# Patient Record
Sex: MALE | Race: BLACK OR AFRICAN AMERICAN | Employment: UNEMPLOYED | URBAN - METROPOLITAN AREA
[De-identification: names, ages, dates, MRNs, and addresses within clinical notes are randomized per-mention and may not be internally consistent; named-entity substitution may affect disease eponyms.]

---

## 2018-07-12 ENCOUNTER — HOSPITAL ENCOUNTER (EMERGENCY)
Age: 33
Discharge: HOME | End: 2018-07-12
Payer: MEDICARE

## 2018-07-12 ENCOUNTER — APPOINTMENT (OUTPATIENT)
Dept: GENERAL RADIOLOGY | Age: 33
End: 2018-07-12
Payer: MEDICARE

## 2018-07-12 VITALS
TEMPERATURE: 98 F | RESPIRATION RATE: 18 BRPM | WEIGHT: 165 LBS | SYSTOLIC BLOOD PRESSURE: 153 MMHG | HEIGHT: 67 IN | HEART RATE: 102 BPM | OXYGEN SATURATION: 98 % | DIASTOLIC BLOOD PRESSURE: 93 MMHG | BODY MASS INDEX: 25.9 KG/M2

## 2018-07-12 DIAGNOSIS — S66.912A WRIST STRAIN, LEFT, INITIAL ENCOUNTER: ICD-10-CM
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
## 2018-07-12 PROCEDURE — 6370000000 HC RX 637 (ALT 250 FOR IP): Performed by: NURSE PRACTITIONER

## 2018-07-12 PROCEDURE — 99283 EMERGENCY DEPT VISIT LOW MDM: CPT

## 2018-07-12 PROCEDURE — 73110 X-RAY EXAM OF WRIST: CPT

## 2018-07-12 PROCEDURE — 73130 X-RAY EXAM OF HAND: CPT

## 2018-07-12 RX ORDER — IBUPROFEN 800 MG/1
800 TABLET ORAL ONCE
Status: COMPLETED | OUTPATIENT
Start: 2018-07-12 | End: 2018-07-12

## 2018-07-12 RX ORDER — IBUPROFEN 800 MG/1
800 TABLET ORAL EVERY 8 HOURS PRN
Qty: 21 TABLET | Refills: 0 | Status: SHIPPED | OUTPATIENT
Start: 2018-07-12 | End: 2018-11-10

## 2018-07-12 RX ADMIN — IBUPROFEN 800 MG: 800 TABLET ORAL at 17:13

## 2018-07-12 ASSESSMENT — PAIN DESCRIPTION - LOCATION: LOCATION: WRIST

## 2018-07-12 ASSESSMENT — PAIN DESCRIPTION - ORIENTATION: ORIENTATION: LEFT

## 2018-07-12 ASSESSMENT — PAIN SCALES - GENERAL
PAINLEVEL_OUTOF10: 8
PAINLEVEL_OUTOF10: 7

## 2018-07-12 ASSESSMENT — PAIN DESCRIPTION - PAIN TYPE: TYPE: ACUTE PAIN

## 2018-11-09 ENCOUNTER — APPOINTMENT (OUTPATIENT)
Dept: CT IMAGING | Age: 33
End: 2018-11-09
Payer: MEDICARE

## 2018-11-09 ENCOUNTER — HOSPITAL ENCOUNTER (EMERGENCY)
Age: 33
Discharge: HOME | End: 2018-11-10
Payer: MEDICARE

## 2018-11-09 DIAGNOSIS — F12.10 MARIJUANA ABUSE: ICD-10-CM

## 2018-11-09 DIAGNOSIS — R11.15 NON-INTRACTABLE CYCLICAL VOMITING WITHOUT NAUSEA: Primary | ICD-10-CM

## 2018-11-09 LAB
ALBUMIN SERPL-MCNC: 4 G/DL (ref 3.5–5.2)
ALP BLD-CCNC: 147 U/L (ref 40–129)
ALT SERPL-CCNC: 61 U/L (ref 0–40)
AMPHETAMINE SCREEN, URINE: NOT DETECTED
ANION GAP SERPL CALCULATED.3IONS-SCNC: 11 MMOL/L (ref 7–16)
AST SERPL-CCNC: 30 U/L (ref 0–39)
BARBITURATE SCREEN URINE: NOT DETECTED
BASOPHILS ABSOLUTE: 0 E9/L (ref 0–0.2)
BASOPHILS RELATIVE PERCENT: 0 % (ref 0–2)
BENZODIAZEPINE SCREEN, URINE: NOT DETECTED
BILIRUB SERPL-MCNC: 0.7 MG/DL (ref 0–1.2)
BILIRUBIN URINE: NEGATIVE
BLOOD, URINE: NEGATIVE
BUN BLDV-MCNC: 12 MG/DL (ref 6–20)
CALCIUM SERPL-MCNC: 10.5 MG/DL (ref 8.6–10.2)
CANNABINOID SCREEN URINE: POSITIVE
CHLORIDE BLD-SCNC: 104 MMOL/L (ref 98–107)
CLARITY: CLEAR
CO2: 26 MMOL/L (ref 22–29)
COCAINE METABOLITE SCREEN URINE: NOT DETECTED
COLOR: YELLOW
CREAT SERPL-MCNC: 0.6 MG/DL (ref 0.7–1.2)
EOSINOPHILS ABSOLUTE: 0.11 E9/L (ref 0.05–0.5)
EOSINOPHILS RELATIVE PERCENT: 2.8 % (ref 0–6)
GFR AFRICAN AMERICAN: >60
GFR NON-AFRICAN AMERICAN: >60 ML/MIN/1.73
GLUCOSE BLD-MCNC: 102 MG/DL (ref 74–99)
GLUCOSE URINE: NEGATIVE MG/DL
HCT VFR BLD CALC: 36.4 % (ref 37–54)
HEMOGLOBIN: 11.5 G/DL (ref 12.5–16.5)
IMMATURE GRANULOCYTES #: 0.01 E9/L
IMMATURE GRANULOCYTES %: 0.3 % (ref 0–5)
KETONES, URINE: NEGATIVE MG/DL
LACTIC ACID: 1.2 MMOL/L (ref 0.5–2.2)
LEUKOCYTE ESTERASE, URINE: ABNORMAL
LYMPHOCYTES ABSOLUTE: 1.65 E9/L (ref 1.5–4)
LYMPHOCYTES RELATIVE PERCENT: 41.6 % (ref 20–42)
MCH RBC QN AUTO: 27 PG (ref 26–35)
MCHC RBC AUTO-ENTMCNC: 31.6 % (ref 32–34.5)
MCV RBC AUTO: 85.4 FL (ref 80–99.9)
METHADONE SCREEN, URINE: NOT DETECTED
MONOCYTES ABSOLUTE: 0.65 E9/L (ref 0.1–0.95)
MONOCYTES RELATIVE PERCENT: 16.4 % (ref 2–12)
NEUTROPHILS ABSOLUTE: 1.55 E9/L (ref 1.8–7.3)
NEUTROPHILS RELATIVE PERCENT: 38.9 % (ref 43–80)
NITRITE, URINE: NEGATIVE
OPIATE SCREEN URINE: NOT DETECTED
PDW BLD-RTO: 14 FL (ref 11.5–15)
PH UA: 6.5 (ref 5–9)
PHENCYCLIDINE SCREEN URINE: NOT DETECTED
PLATELET # BLD: 274 E9/L (ref 130–450)
PMV BLD AUTO: 10.2 FL (ref 7–12)
POTASSIUM SERPL-SCNC: 4.3 MMOL/L (ref 3.5–5)
PROPOXYPHENE SCREEN: NOT DETECTED
PROTEIN UA: NEGATIVE MG/DL
RBC # BLD: 4.26 E12/L (ref 3.8–5.8)
SODIUM BLD-SCNC: 141 MMOL/L (ref 132–146)
SPECIFIC GRAVITY UA: 1.02 (ref 1–1.03)
TOTAL PROTEIN: 7.1 G/DL (ref 6.4–8.3)
UROBILINOGEN, URINE: 0.2 E.U./DL
WBC # BLD: 4 E9/L (ref 4.5–11.5)

## 2018-11-09 PROCEDURE — 70450 CT HEAD/BRAIN W/O DYE: CPT

## 2018-11-09 PROCEDURE — 36415 COLL VENOUS BLD VENIPUNCTURE: CPT

## 2018-11-09 PROCEDURE — 2580000003 HC RX 258: Performed by: NURSE PRACTITIONER

## 2018-11-09 PROCEDURE — 99284 EMERGENCY DEPT VISIT MOD MDM: CPT

## 2018-11-09 PROCEDURE — 96374 THER/PROPH/DIAG INJ IV PUSH: CPT

## 2018-11-09 PROCEDURE — G0480 DRUG TEST DEF 1-7 CLASSES: HCPCS

## 2018-11-09 PROCEDURE — 85025 COMPLETE CBC W/AUTO DIFF WBC: CPT

## 2018-11-09 PROCEDURE — 80307 DRUG TEST PRSMV CHEM ANLYZR: CPT

## 2018-11-09 PROCEDURE — 83605 ASSAY OF LACTIC ACID: CPT

## 2018-11-09 PROCEDURE — 81001 URINALYSIS AUTO W/SCOPE: CPT

## 2018-11-09 PROCEDURE — 96375 TX/PRO/DX INJ NEW DRUG ADDON: CPT

## 2018-11-09 PROCEDURE — 6360000002 HC RX W HCPCS: Performed by: NURSE PRACTITIONER

## 2018-11-09 PROCEDURE — 80053 COMPREHEN METABOLIC PANEL: CPT

## 2018-11-09 RX ORDER — 0.9 % SODIUM CHLORIDE 0.9 %
1000 INTRAVENOUS SOLUTION INTRAVENOUS ONCE
Status: COMPLETED | OUTPATIENT
Start: 2018-11-09 | End: 2018-11-09

## 2018-11-09 RX ORDER — DIPHENHYDRAMINE HYDROCHLORIDE 50 MG/ML
25 INJECTION INTRAMUSCULAR; INTRAVENOUS ONCE
Status: COMPLETED | OUTPATIENT
Start: 2018-11-09 | End: 2018-11-09

## 2018-11-09 RX ORDER — KETOROLAC TROMETHAMINE 30 MG/ML
30 INJECTION, SOLUTION INTRAMUSCULAR; INTRAVENOUS ONCE
Status: DISCONTINUED | OUTPATIENT
Start: 2018-11-09 | End: 2018-11-10 | Stop reason: HOSPADM

## 2018-11-09 RX ORDER — METOCLOPRAMIDE HYDROCHLORIDE 5 MG/ML
10 INJECTION INTRAMUSCULAR; INTRAVENOUS ONCE
Status: COMPLETED | OUTPATIENT
Start: 2018-11-09 | End: 2018-11-09

## 2018-11-09 RX ADMIN — DIPHENHYDRAMINE HYDROCHLORIDE 25 MG: 50 INJECTION, SOLUTION INTRAMUSCULAR; INTRAVENOUS at 22:40

## 2018-11-09 RX ADMIN — SODIUM CHLORIDE 1000 ML: 9 INJECTION, SOLUTION INTRAVENOUS at 22:40

## 2018-11-09 RX ADMIN — METOCLOPRAMIDE 10 MG: 5 INJECTION, SOLUTION INTRAMUSCULAR; INTRAVENOUS at 22:40

## 2018-11-09 ASSESSMENT — PAIN SCALES - GENERAL: PAINLEVEL_OUTOF10: 4

## 2018-11-09 ASSESSMENT — PAIN DESCRIPTION - LOCATION: LOCATION: HEAD

## 2018-11-09 ASSESSMENT — PAIN DESCRIPTION - ORIENTATION: ORIENTATION: ANTERIOR

## 2018-11-09 ASSESSMENT — PAIN DESCRIPTION - PAIN TYPE: TYPE: ACUTE PAIN

## 2018-11-09 ASSESSMENT — PAIN DESCRIPTION - DESCRIPTORS: DESCRIPTORS: CONSTANT;DISCOMFORT

## 2018-11-10 VITALS
HEIGHT: 66 IN | RESPIRATION RATE: 16 BRPM | HEART RATE: 89 BPM | SYSTOLIC BLOOD PRESSURE: 150 MMHG | WEIGHT: 165 LBS | TEMPERATURE: 97.4 F | DIASTOLIC BLOOD PRESSURE: 80 MMHG | BODY MASS INDEX: 26.52 KG/M2 | OXYGEN SATURATION: 100 %

## 2018-11-10 LAB
BACTERIA: ABNORMAL /HPF
RBC UA: ABNORMAL /HPF (ref 0–2)
WBC UA: ABNORMAL /HPF (ref 0–5)

## 2018-11-10 RX ORDER — IBUPROFEN 800 MG/1
800 TABLET ORAL EVERY 8 HOURS PRN
Qty: 21 TABLET | Refills: 0 | Status: SHIPPED | OUTPATIENT
Start: 2018-11-10 | End: 2019-01-07

## 2018-11-10 RX ORDER — ONDANSETRON 4 MG/1
4 TABLET, ORALLY DISINTEGRATING ORAL EVERY 8 HOURS PRN
Qty: 10 TABLET | Refills: 0 | Status: SHIPPED | OUTPATIENT
Start: 2018-11-10 | End: 2019-01-07

## 2018-11-10 NOTE — ED NOTES
Patient sleeping, respirations non-labored, no distress noted. Patient responds to voice, A&OX4 when awake. Patient reports headache is resolved. Patient voicing no complaints at present.       Gloria Delacruz RN  11/10/18 [id number]

## 2018-11-15 LAB — CANNABINOIDS CONF, URINE: 203 NG/ML

## 2019-01-07 ENCOUNTER — OFFICE VISIT (OUTPATIENT)
Dept: ENDOCRINOLOGY | Age: 34
End: 2019-01-07
Payer: MEDICARE

## 2019-01-07 VITALS
SYSTOLIC BLOOD PRESSURE: 140 MMHG | DIASTOLIC BLOOD PRESSURE: 90 MMHG | HEART RATE: 100 BPM | OXYGEN SATURATION: 98 % | RESPIRATION RATE: 16 BRPM | BODY MASS INDEX: 23.04 KG/M2 | HEIGHT: 68 IN | WEIGHT: 152 LBS

## 2019-01-07 DIAGNOSIS — E05.00 GRAVES DISEASE: Primary | ICD-10-CM

## 2019-01-07 DIAGNOSIS — E55.9 VITAMIN D DEFICIENCY: ICD-10-CM

## 2019-01-07 DIAGNOSIS — E05.90 HYPERTHYROIDISM: ICD-10-CM

## 2019-01-07 PROCEDURE — 99204 OFFICE O/P NEW MOD 45 MIN: CPT | Performed by: INTERNAL MEDICINE

## 2019-01-07 PROCEDURE — 4004F PT TOBACCO SCREEN RCVD TLK: CPT | Performed by: INTERNAL MEDICINE

## 2019-01-07 PROCEDURE — G8484 FLU IMMUNIZE NO ADMIN: HCPCS | Performed by: INTERNAL MEDICINE

## 2019-01-07 PROCEDURE — G8427 DOCREV CUR MEDS BY ELIG CLIN: HCPCS | Performed by: INTERNAL MEDICINE

## 2019-01-07 PROCEDURE — G8420 CALC BMI NORM PARAMETERS: HCPCS | Performed by: INTERNAL MEDICINE

## 2019-01-07 RX ORDER — PROPRANOLOL HYDROCHLORIDE 20 MG/1
TABLET ORAL
Refills: 0 | COMMUNITY
Start: 2018-10-11 | End: 2019-01-07

## 2019-01-07 RX ORDER — METHIMAZOLE 10 MG/1
15 TABLET ORAL 2 TIMES DAILY
Qty: 90 TABLET | Refills: 5 | Status: SHIPPED | OUTPATIENT
Start: 2019-01-07 | End: 2019-03-11 | Stop reason: SDUPTHER

## 2019-01-07 RX ORDER — PROPRANOLOL HCL 60 MG
60 CAPSULE, EXTENDED RELEASE 24HR ORAL DAILY
Qty: 30 CAPSULE | Refills: 0 | Status: SHIPPED | OUTPATIENT
Start: 2019-01-07 | End: 2019-03-11

## 2019-01-14 ENCOUNTER — HOSPITAL ENCOUNTER (OUTPATIENT)
Age: 34
Discharge: HOME | End: 2019-01-14
Payer: MEDICARE

## 2019-01-14 DIAGNOSIS — E05.90 HYPERTHYROIDISM: ICD-10-CM

## 2019-01-14 DIAGNOSIS — E05.00 GRAVES DISEASE: ICD-10-CM

## 2019-01-14 DIAGNOSIS — E55.9 VITAMIN D DEFICIENCY: ICD-10-CM

## 2019-01-14 LAB
ALBUMIN SERPL-MCNC: 4.1 G/DL (ref 3.5–5.2)
ALP BLD-CCNC: 186 U/L (ref 40–129)
ALT SERPL-CCNC: 52 U/L (ref 0–40)
ANION GAP SERPL CALCULATED.3IONS-SCNC: 14 MMOL/L (ref 7–16)
AST SERPL-CCNC: 27 U/L (ref 0–39)
BILIRUB SERPL-MCNC: 0.4 MG/DL (ref 0–1.2)
BUN BLDV-MCNC: 10 MG/DL (ref 6–20)
CALCIUM SERPL-MCNC: 9.9 MG/DL (ref 8.6–10.2)
CHLORIDE BLD-SCNC: 103 MMOL/L (ref 98–107)
CO2: 23 MMOL/L (ref 22–29)
CREAT SERPL-MCNC: 0.5 MG/DL (ref 0.7–1.2)
GFR AFRICAN AMERICAN: >60
GFR NON-AFRICAN AMERICAN: >60 ML/MIN/1.73
GLUCOSE BLD-MCNC: 135 MG/DL (ref 74–99)
HCT VFR BLD CALC: 39.9 % (ref 37–54)
HEMOGLOBIN: 13 G/DL (ref 12.5–16.5)
MCH RBC QN AUTO: 26.8 PG (ref 26–35)
MCHC RBC AUTO-ENTMCNC: 32.6 % (ref 32–34.5)
MCV RBC AUTO: 82.3 FL (ref 80–99.9)
PDW BLD-RTO: 13.9 FL (ref 11.5–15)
PLATELET # BLD: 285 E9/L (ref 130–450)
PMV BLD AUTO: 10.3 FL (ref 7–12)
POTASSIUM SERPL-SCNC: 4.2 MMOL/L (ref 3.5–5)
RBC # BLD: 4.85 E12/L (ref 3.8–5.8)
SODIUM BLD-SCNC: 140 MMOL/L (ref 132–146)
T3 TOTAL: 251.6 NG/DL (ref 80–200)
T4 FREE: 2.66 NG/DL (ref 0.93–1.7)
TOTAL PROTEIN: 7.3 G/DL (ref 6.4–8.3)
TSH SERPL DL<=0.05 MIU/L-ACNC: <0.005 UIU/ML (ref 0.27–4.2)
VITAMIN D 25-HYDROXY: 15 NG/ML (ref 30–100)
WBC # BLD: 4.7 E9/L (ref 4.5–11.5)

## 2019-01-14 PROCEDURE — 85027 COMPLETE CBC AUTOMATED: CPT

## 2019-01-14 PROCEDURE — 84480 ASSAY TRIIODOTHYRONINE (T3): CPT

## 2019-01-14 PROCEDURE — 82306 VITAMIN D 25 HYDROXY: CPT

## 2019-01-14 PROCEDURE — 84439 ASSAY OF FREE THYROXINE: CPT

## 2019-01-14 PROCEDURE — 80053 COMPREHEN METABOLIC PANEL: CPT

## 2019-01-14 PROCEDURE — 84443 ASSAY THYROID STIM HORMONE: CPT

## 2019-01-14 PROCEDURE — 36415 COLL VENOUS BLD VENIPUNCTURE: CPT

## 2019-01-15 ENCOUNTER — TELEPHONE (OUTPATIENT)
Dept: ENDOCRINOLOGY | Age: 34
End: 2019-01-15

## 2019-01-16 ENCOUNTER — TELEPHONE (OUTPATIENT)
Dept: ENDOCRINOLOGY | Age: 34
End: 2019-01-16

## 2019-03-11 ENCOUNTER — OFFICE VISIT (OUTPATIENT)
Dept: ENDOCRINOLOGY | Age: 34
End: 2019-03-11
Payer: MEDICARE

## 2019-03-11 VITALS
WEIGHT: 173.4 LBS | RESPIRATION RATE: 16 BRPM | SYSTOLIC BLOOD PRESSURE: 112 MMHG | OXYGEN SATURATION: 97 % | HEIGHT: 68 IN | BODY MASS INDEX: 26.28 KG/M2 | HEART RATE: 66 BPM | DIASTOLIC BLOOD PRESSURE: 72 MMHG

## 2019-03-11 DIAGNOSIS — E05.90 HYPERTHYROIDISM: Primary | ICD-10-CM

## 2019-03-11 PROCEDURE — G8427 DOCREV CUR MEDS BY ELIG CLIN: HCPCS | Performed by: INTERNAL MEDICINE

## 2019-03-11 PROCEDURE — G8484 FLU IMMUNIZE NO ADMIN: HCPCS | Performed by: INTERNAL MEDICINE

## 2019-03-11 PROCEDURE — 99214 OFFICE O/P EST MOD 30 MIN: CPT | Performed by: INTERNAL MEDICINE

## 2019-03-11 PROCEDURE — G8419 CALC BMI OUT NRM PARAM NOF/U: HCPCS | Performed by: INTERNAL MEDICINE

## 2019-03-11 PROCEDURE — 4004F PT TOBACCO SCREEN RCVD TLK: CPT | Performed by: INTERNAL MEDICINE

## 2019-03-11 RX ORDER — METHIMAZOLE 10 MG/1
15 TABLET ORAL 2 TIMES DAILY
Qty: 90 TABLET | Refills: 12 | Status: SHIPPED | OUTPATIENT
Start: 2019-03-11 | End: 2019-08-21

## 2019-03-15 ENCOUNTER — HOSPITAL ENCOUNTER (OUTPATIENT)
Age: 34
Discharge: HOME | End: 2019-03-15
Payer: MEDICARE

## 2019-03-15 DIAGNOSIS — E05.90 HYPERTHYROIDISM: ICD-10-CM

## 2019-03-15 LAB
T4 FREE: 0.25 NG/DL (ref 0.93–1.7)
TSH SERPL DL<=0.05 MIU/L-ACNC: 5.46 UIU/ML (ref 0.27–4.2)

## 2019-03-15 PROCEDURE — 36415 COLL VENOUS BLD VENIPUNCTURE: CPT

## 2019-03-15 PROCEDURE — 84439 ASSAY OF FREE THYROXINE: CPT

## 2019-03-15 PROCEDURE — 84443 ASSAY THYROID STIM HORMONE: CPT

## 2019-03-17 ENCOUNTER — TELEPHONE (OUTPATIENT)
Dept: ENDOCRINOLOGY | Age: 34
End: 2019-03-17

## 2019-03-17 DIAGNOSIS — E05.90 HYPERTHYROIDISM: Primary | ICD-10-CM

## 2019-08-21 ENCOUNTER — OFFICE VISIT (OUTPATIENT)
Dept: ENDOCRINOLOGY | Age: 34
End: 2019-08-21
Payer: COMMERCIAL

## 2019-08-21 VITALS
OXYGEN SATURATION: 97 % | BODY MASS INDEX: 28.46 KG/M2 | RESPIRATION RATE: 16 BRPM | WEIGHT: 187.8 LBS | DIASTOLIC BLOOD PRESSURE: 82 MMHG | SYSTOLIC BLOOD PRESSURE: 136 MMHG | HEIGHT: 68 IN | HEART RATE: 81 BPM

## 2019-08-21 DIAGNOSIS — E05.90 HYPERTHYROIDISM: ICD-10-CM

## 2019-08-21 DIAGNOSIS — E05.00 GRAVES DISEASE: Primary | ICD-10-CM

## 2019-08-21 PROCEDURE — 99214 OFFICE O/P EST MOD 30 MIN: CPT | Performed by: INTERNAL MEDICINE

## 2019-08-21 PROCEDURE — G8419 CALC BMI OUT NRM PARAM NOF/U: HCPCS | Performed by: INTERNAL MEDICINE

## 2019-08-21 PROCEDURE — 4004F PT TOBACCO SCREEN RCVD TLK: CPT | Performed by: INTERNAL MEDICINE

## 2019-08-21 PROCEDURE — G8427 DOCREV CUR MEDS BY ELIG CLIN: HCPCS | Performed by: INTERNAL MEDICINE

## 2019-08-21 RX ORDER — METHIMAZOLE 5 MG/1
TABLET ORAL
Qty: 180 TABLET | Refills: 5 | Status: SHIPPED
Start: 2019-08-21 | End: 2020-04-30

## 2019-08-26 ENCOUNTER — HOSPITAL ENCOUNTER (OUTPATIENT)
Age: 34
Discharge: HOME | End: 2019-08-26
Payer: COMMERCIAL

## 2019-08-26 DIAGNOSIS — E05.00 GRAVES DISEASE: ICD-10-CM

## 2019-08-26 DIAGNOSIS — E05.90 HYPERTHYROIDISM: ICD-10-CM

## 2019-08-26 LAB
T3 TOTAL: 28.01 NG/DL (ref 80–200)
T4 FREE: <0.1 NG/DL (ref 0.93–1.7)
TSH SERPL DL<=0.05 MIU/L-ACNC: 111.2 UIU/ML (ref 0.27–4.2)

## 2019-08-26 PROCEDURE — 36415 COLL VENOUS BLD VENIPUNCTURE: CPT

## 2019-08-26 PROCEDURE — 84480 ASSAY TRIIODOTHYRONINE (T3): CPT

## 2019-08-26 PROCEDURE — 84443 ASSAY THYROID STIM HORMONE: CPT

## 2019-08-26 PROCEDURE — 84439 ASSAY OF FREE THYROXINE: CPT

## 2019-08-27 ENCOUNTER — TELEPHONE (OUTPATIENT)
Dept: ENDOCRINOLOGY | Age: 34
End: 2019-08-27

## 2019-08-27 DIAGNOSIS — E05.90 HYPERTHYROIDISM: ICD-10-CM

## 2019-08-27 DIAGNOSIS — E05.00 GRAVES DISEASE: Primary | ICD-10-CM

## 2019-08-27 NOTE — TELEPHONE ENCOUNTER
Notify pt,  I have reviewed your recent results    Thyroid hormones level are extremely low.  Few weeks ago  I decreased methimazole from 10 mg BID to 5 mg BID     Please confirm pt currently taking Methimazole 5 mg BID    If so, stop Methimazole completely for 5 days then do blood work next Monday

## 2019-09-03 ENCOUNTER — TELEPHONE (OUTPATIENT)
Dept: ENDOCRINOLOGY | Age: 34
End: 2019-09-03

## 2019-09-03 ENCOUNTER — HOSPITAL ENCOUNTER (OUTPATIENT)
Age: 34
Discharge: HOME | End: 2019-09-03
Payer: COMMERCIAL

## 2019-09-03 DIAGNOSIS — E05.00 GRAVES DISEASE: Primary | ICD-10-CM

## 2019-09-03 DIAGNOSIS — E05.90 HYPERTHYROIDISM: ICD-10-CM

## 2019-09-03 DIAGNOSIS — E05.00 GRAVES DISEASE: ICD-10-CM

## 2019-09-03 LAB
T4 FREE: 0.5 NG/DL (ref 0.93–1.7)
T4 TOTAL: 3.2 MCG/DL (ref 4.5–11.7)
TSH SERPL DL<=0.05 MIU/L-ACNC: 9.9 UIU/ML (ref 0.27–4.2)

## 2019-09-03 PROCEDURE — 84439 ASSAY OF FREE THYROXINE: CPT

## 2019-09-03 PROCEDURE — 36415 COLL VENOUS BLD VENIPUNCTURE: CPT

## 2019-09-03 PROCEDURE — 84443 ASSAY THYROID STIM HORMONE: CPT

## 2019-09-04 ENCOUNTER — HOSPITAL ENCOUNTER (EMERGENCY)
Age: 34
Discharge: HOME | End: 2019-09-04
Payer: COMMERCIAL

## 2019-09-04 VITALS
OXYGEN SATURATION: 97 % | SYSTOLIC BLOOD PRESSURE: 139 MMHG | TEMPERATURE: 97.6 F | HEART RATE: 74 BPM | DIASTOLIC BLOOD PRESSURE: 96 MMHG | RESPIRATION RATE: 18 BRPM

## 2019-09-04 DIAGNOSIS — J02.9 ACUTE PHARYNGITIS, UNSPECIFIED ETIOLOGY: Primary | ICD-10-CM

## 2019-09-04 PROCEDURE — 99282 EMERGENCY DEPT VISIT SF MDM: CPT

## 2019-09-04 RX ORDER — AMOXICILLIN AND CLAVULANATE POTASSIUM 875; 125 MG/1; MG/1
1 TABLET, FILM COATED ORAL 2 TIMES DAILY
Qty: 14 TABLET | Refills: 0 | Status: SHIPPED | OUTPATIENT
Start: 2019-09-04 | End: 2019-09-11

## 2019-09-04 ASSESSMENT — PAIN SCALES - GENERAL: PAINLEVEL_OUTOF10: 6

## 2019-09-05 NOTE — ED PROVIDER NOTES
pneumonia. Patient is well appearing, non toxic and appropriate for outpatient management. Plan of Care: Normal progression of disease discussed. All questions answered. Instruction provided in the use of fluids, vaporizer, acetaminophen, and other OTC medication for symptom control. Extra fluids  Analgesics as needed, dose reviewed. Follow-up in 1 days, or sooner should symptoms worsen. Counseling: The emergency provider has spoken with the patient and discussed todays results, in addition to providing specific details for the plan of care and counseling regarding the diagnosis and prognosis. Questions are answered at this time and they are agreeable with the plan. Assessment     1. Acute pharyngitis, unspecified etiology      Plan   Discharge to home  Patient condition is good    New Medications     Discharge Medication List as of 9/4/2019  9:42 AM      START taking these medications    Details   amoxicillin-clavulanate (AUGMENTIN) 875-125 MG per tablet Take 1 tablet by mouth 2 times daily for 7 days, Disp-14 tablet, R-0Print           Electronically signed by CHRISTOPHER Rhodes   DD: 9/5/19  **This report was transcribed using voice recognition software. Every effort was made to ensure accuracy; however, inadvertent computerized transcription errors may be present.   END OF ED PROVIDER NOTE       Moose Rhodes  09/05/19 [id number]

## 2019-12-19 ENCOUNTER — TELEPHONE (OUTPATIENT)
Dept: ADMINISTRATIVE | Age: 34
End: 2019-12-19

## 2020-04-13 ENCOUNTER — HOSPITAL ENCOUNTER (OUTPATIENT)
Dept: ULTRASOUND IMAGING | Age: 35
Discharge: HOME | End: 2020-04-15
Payer: COMMERCIAL

## 2020-04-13 PROCEDURE — 76870 US EXAM SCROTUM: CPT

## 2020-04-30 ENCOUNTER — VIRTUAL VISIT (OUTPATIENT)
Dept: ENDOCRINOLOGY | Age: 35
End: 2020-04-30
Payer: COMMERCIAL

## 2020-04-30 PROCEDURE — G8419 CALC BMI OUT NRM PARAM NOF/U: HCPCS | Performed by: INTERNAL MEDICINE

## 2020-04-30 PROCEDURE — 4004F PT TOBACCO SCREEN RCVD TLK: CPT | Performed by: INTERNAL MEDICINE

## 2020-04-30 PROCEDURE — 99214 OFFICE O/P EST MOD 30 MIN: CPT | Performed by: INTERNAL MEDICINE

## 2020-04-30 PROCEDURE — G8427 DOCREV CUR MEDS BY ELIG CLIN: HCPCS | Performed by: INTERNAL MEDICINE

## 2020-04-30 NOTE — PROGRESS NOTES
Drugs:   reports current drug use. FAMILY HISTORY   Family History   Problem Relation Age of Onset    Hyperthyroidism Maternal Aunt      ALLERGIES AND DRUG REACTIONS   No Known Allergies    CURRENT MEDICATIONS     No current outpatient medications on file. No current facility-administered medications for this visit. Review of Systems  Constitutional: No fever, no chills, no diaphoresis, no generalized weakness. HEENT: No blurred vision, No sore throat, no ear pain, no hair loss  Neck: denied any neck swelling, difficulty swallowing,   Cadrdiopulomary: No CP, SOB or palpitation, No orthopnea or PND. No cough or wheezing. GI: No N/V/D, no constipation, No abdominal pain, no melena or hematochezia   : Denied any dysuria, hematuria, flank pain, discharge, or incontinence. Skin: denied any rash, ulcer, Hirsute, or hyperpigmentation. MSK: denied any joint deformity, joint pain/swelling, muscle pain, or back pain. Neuro: no numbess, no tingling, no weakness,     OBJECTIVE    There were no vitals taken for this visit. BP Readings from Last 4 Encounters:   09/04/19 (!) 139/96   08/21/19 136/82   03/11/19 112/72   01/07/19 (!) 140/90     Wt Readings from Last 6 Encounters:   08/21/19 187 lb 12.8 oz (85.2 kg)   03/11/19 173 lb 6.4 oz (78.7 kg)   01/07/19 152 lb (68.9 kg)   11/09/18 165 lb (74.8 kg)   07/12/18 165 lb (74.8 kg)     Due to this being a TeleHealth encounter, evaluation of the following organ systems is limited: Vitals/Constitutional/EENT/Resp/CV/GI//MS/Neuro/Skin/Heme-Lymph-Imm. Modified physical exam through Telemedicine camera    General: Communicating well via camera   Neck: no obvious neck mass. No obvious neck deformity     CVS: no distress   Chest: no distress. Chest is moving with respiration    Extremities:  no visible tremor  Skin: No visible rashes as seen from camera   Musculoskeletal: no visible deformity  Neuro: Alert and oriented to person, place, and time. Psychiatric: Normal mood and affect. Behavior is normal    Review of Laboratory Data:  I have reviewed the following:  Lab Results   Component Value Date/Time    WBC 4.7 01/14/2019 01:25 PM    RBC 4.85 01/14/2019 01:25 PM    HGB 13.0 01/14/2019 01:25 PM    HCT 39.9 01/14/2019 01:25 PM    MCV 82.3 01/14/2019 01:25 PM    MCH 26.8 01/14/2019 01:25 PM    MCHC 32.6 01/14/2019 01:25 PM    RDW 13.9 01/14/2019 01:25 PM     01/14/2019 01:25 PM    MPV 10.3 01/14/2019 01:25 PM      Lab Results   Component Value Date/Time     01/14/2019 01:25 PM    K 4.2 01/14/2019 01:25 PM    CO2 23 01/14/2019 01:25 PM    BUN 10 01/14/2019 01:25 PM    CALCIUM 9.9 01/14/2019 01:25 PM      Lab Results   Component Value Date/Time    TSH 9.900 (H) 09/03/2019 03:25 PM    T4FREE 0.50 (L) 09/03/2019 03:25 PM    C9NJBLV 3.2 (L) 09/03/2019 03:25 PM    Z2OXBIX 28.01 (L) 08/26/2019 11:14 AM     Lab Results   Component Value Date    GLUCOSE 135 01/14/2019    GLUCOSE 108 12/30/2011     Medical Records/Labs/Images review:   I personally reviewed and summarized previous records   All labs were reviewed independently     40 Skyla Villa, a 29 y.o.-old male seen in for management of hyperthyroidism     Graves Hyperthyroidism  · Off Methimazole in 8/2019 due low thyroid hormones (was on Methimazole from 9/2018 to 8/2019)   · Check TFT now  · With graves eye disease and active smoking, I will avoid RODRIGUEZ ablation as a first option  · Reviewed potential rare but serious side effects of Methimazole, including agranulocytosis and liver dysfunction (cholestasis). vitD deficiency    · continue daily  vitD 2000 iu/day   · Discussed the effect of hyperthyroidism on bone health  · Check level     Return in about 10 weeks (around 7/9/2020) for Hyperthyroidism . The above issues were reviewed with the patient who understood and agreed with the plan. Thank you for allowing us to participate in the care of this patient.

## 2020-04-30 NOTE — PROGRESS NOTES
Haven Mack was read the following message We want to confirm that, for purposes of billing, this is a virtual visit with your provider for which we will submit a claim for reimbursement with your insurance company. You will be responsible for any copays, coinsurance amounts or other amounts not covered by your insurance company. If you do not accept this, unfortunately we will not be able to schedule a virtual visit with the provider. Do you accept?  Mariya Ulloa responded YES

## 2020-05-08 ENCOUNTER — HOSPITAL ENCOUNTER (OUTPATIENT)
Age: 35
Discharge: HOME | End: 2020-05-08
Payer: COMMERCIAL

## 2020-05-08 LAB
ALBUMIN SERPL-MCNC: 4.6 G/DL (ref 3.5–5.2)
ALP BLD-CCNC: 87 U/L (ref 40–129)
ALT SERPL-CCNC: 58 U/L (ref 0–40)
ANION GAP SERPL CALCULATED.3IONS-SCNC: 12 MMOL/L (ref 7–16)
AST SERPL-CCNC: 29 U/L (ref 0–39)
BILIRUB SERPL-MCNC: 0.5 MG/DL (ref 0–1.2)
BUN BLDV-MCNC: 12 MG/DL (ref 6–20)
CALCIUM SERPL-MCNC: 10.1 MG/DL (ref 8.6–10.2)
CHLORIDE BLD-SCNC: 104 MMOL/L (ref 98–107)
CO2: 22 MMOL/L (ref 22–29)
CREAT SERPL-MCNC: 0.8 MG/DL (ref 0.7–1.2)
GFR AFRICAN AMERICAN: >60
GFR NON-AFRICAN AMERICAN: >60 ML/MIN/1.73
GLUCOSE BLD-MCNC: 110 MG/DL (ref 74–99)
POTASSIUM SERPL-SCNC: 4.1 MMOL/L (ref 3.5–5)
SODIUM BLD-SCNC: 138 MMOL/L (ref 132–146)
T3 TOTAL: 342.1 NG/DL (ref 80–200)
T4 FREE: 3.38 NG/DL (ref 0.93–1.7)
TOTAL PROTEIN: 7.6 G/DL (ref 6.4–8.3)
TSH SERPL DL<=0.05 MIU/L-ACNC: <0.01 UIU/ML (ref 0.27–4.2)
VITAMIN D 25-HYDROXY: 26 NG/ML (ref 30–100)

## 2020-05-10 ENCOUNTER — TELEPHONE (OUTPATIENT)
Dept: ENDOCRINOLOGY | Age: 35
End: 2020-05-10

## 2020-05-10 RX ORDER — METHIMAZOLE 10 MG/1
TABLET ORAL
Qty: 45 TABLET | Refills: 4 | Status: SHIPPED
Start: 2020-05-10 | End: 2020-05-12

## 2020-05-10 NOTE — TELEPHONE ENCOUNTER
Notify pt,  I have reviewed your recent results    Thyroid hormones are very high  Start Methimazole 10 mg 1&1/2 tab (15 mg) daily and repeat labs in last week of June    Please make sure to get blood work done in late June so I can help adjusting your dose

## 2020-05-12 LAB
THYROGLOBULIN AB: <0.9 IU/ML (ref 0–4)
THYROID PEROXIDASE (TPO) ABS: 135.7 IU/ML (ref 0–9)
THYROID STIMULATING IMMUNOGLOBULIN: 9.37 IU/L

## 2020-05-12 RX ORDER — METHIMAZOLE 10 MG/1
TABLET ORAL
Qty: 135 TABLET | Refills: 1 | Status: SHIPPED | OUTPATIENT
Start: 2020-05-12 | End: 2020-06-02 | Stop reason: SDUPTHER

## 2020-06-02 RX ORDER — METHIMAZOLE 10 MG/1
TABLET ORAL
Qty: 135 TABLET | Refills: 1 | Status: SHIPPED
Start: 2020-06-02 | End: 2021-04-19

## 2020-07-09 ENCOUNTER — VIRTUAL VISIT (OUTPATIENT)
Dept: ENDOCRINOLOGY | Age: 35
End: 2020-07-09
Payer: COMMERCIAL

## 2020-07-09 PROCEDURE — G8419 CALC BMI OUT NRM PARAM NOF/U: HCPCS | Performed by: INTERNAL MEDICINE

## 2020-07-09 PROCEDURE — G8427 DOCREV CUR MEDS BY ELIG CLIN: HCPCS | Performed by: INTERNAL MEDICINE

## 2020-07-09 PROCEDURE — 99214 OFFICE O/P EST MOD 30 MIN: CPT | Performed by: INTERNAL MEDICINE

## 2020-07-09 PROCEDURE — 4004F PT TOBACCO SCREEN RCVD TLK: CPT | Performed by: INTERNAL MEDICINE

## 2020-07-09 NOTE — LETTER
Rt lobe measures 6.5 x 2.8 x 2.7 cm --> increase vascularity no nodules  Lt lobe measures 5.9 c 2.4 x 3.3 cm --> increase vascularity, no nodules     10/23/2018 Thyroid Uptake and scan   Homogenous uptake, 24 hrs uptake was 79.9% consistent with graves disease    Pt was on Methimazole form 9/2018 to 8/2019   Methimazole was stopped in 8/2019 because very low thyroid hormones     Repeated labs on 5/8/2020 - TSH < 0.01, Free T4 3.3.8    Pt was restarted on methimazole and currently yon 15 mg daily     Pt told me that over the past 3-4 weeks was complaint with taking methimazole every morning (reported missing doses in the past)      He still Smokes 1/2 PPD for almost for 20 years     PAST MEDICAL HISTORY   Past Medical History:   Diagnosis Date    Hyperthyroidism     Vitamin D deficiency      PAST SURGICAL HISTORY   No past surgical history on file. SOCIAL HISTORY   Tobacco:   reports that he has been smoking cigarettes. He has been smoking about 0.50 packs per day. He has never used smokeless tobacco.  Alcol:   reports current alcohol use. Illicit Drugs:   reports current drug use. FAMILY HISTORY   Family History   Problem Relation Age of Onset    Hyperthyroidism Maternal Aunt      ALLERGIES AND DRUG REACTIONS   No Known Allergies    CURRENT MEDICATIONS     Current Outpatient Medications   Medication Sig Dispense Refill    methIMAzole (TAPAZOLE) 10 MG tablet TAKE 1 AND 1/2 TABLETS(15 MG) BY MOUTH DAILY 135 tablet 1     No current facility-administered medications for this visit. Review of Systems  Constitutional: No fever, no chills, no diaphoresis, no generalized weakness. HEENT: No blurred vision, No sore throat, no ear pain, no hair loss  Neck: denied any neck swelling, difficulty swallowing,   Cadrdiopulomary: No CP, SOB or palpitation, No orthopnea or PND. No cough or wheezing.   GI: No N/V/D, no constipation, No abdominal pain, no melena or hematochezia TSH <0.010 (L) 05/08/2020 11:25 AM    T4FREE 3.38 (H) 05/08/2020 11:25 AM    L3QXVXA 3.2 (L) 09/03/2019 03:25 PM    A0CKGFE 342.10 (H) 05/08/2020 11:25 AM    TSI 9.37 (H) 05/08/2020 11:25 AM    TPOABS 135.7 (H) 05/08/2020 11:25 AM    THGAB <0.9 05/08/2020 11:25 AM     Lab Results   Component Value Date    GLUCOSE 110 05/08/2020    GLUCOSE 108 12/30/2011     Medical Records/Labs/Images review:   I personally reviewed and summarized previous records   All labs were reviewed independently     40 Skyla Villa, a 29 y.o.-old male seen in for management of hyperthyroidism     Graves Hyperthyroidism  · H/o poor compliance with taking methimazole but reported doing btter recently  · Currently oon Methimazole 15 mg daily   · Check TFT and adjust the dose if needed   · With graves eye disease and active smoking, I will avoid RODRIGUEZ ablation as a first option  · Reviewed potential rare but serious side effects of Methimazole, including agranulocytosis and liver dysfunction (cholestasis). Poor medication compliance   · Started getting better with taking medication as prescribed  · I had a long discussion with patient and explained the importance of taking thyroid medication as prescribed and importance of appropriately treating hyperthyroidism to prevent cardiac arrhythmia     vitD deficiency    · continue daily  vitD supplement   · Discussed the effect of hyperthyroidism on bone health    Return in about 4 months (around 11/9/2020) for Graves hyperthyroidism . The above issues were reviewed with the patient who understood and agreed with the plan. Thank you for allowing us to participate in the care of this patient. Please do not hesitate to contact us with any additional questions. Diagnosis Orders   1. Graves disease  TSH without Reflex    T4, Free    T3   2. Vitamin D deficiency     3.  H/O medication noncompliance         Mary Alice Mason MD  Endocrinologist, [place] [facility]) [address] [facility], [address] [place] [address],[address] [id number]   Phone: [phone]  Fax: [phone]  ---------------------------------  An electronic signature was used to authenticate this note. Yaritza Flannery MD on 7/9/2020 at 12:28 PM  Services were provided through a video synchronous discussion virtually to substitute for in-person clinic visit. Pursuant to the emergency declaration under the [address] [place] [address], [facility] waiver authority and the [employer] and [employer] Act, this Virtual  Visit was conducted, with patient's consent, to reduce the patient's risk of exposure to COVID-19 and provide continuity of care.

## 2020-07-09 NOTE — PROGRESS NOTES
[address] [place] [address] Department of Endocrinology Diabetes and Metabolism   [address] [facility] [address]   Phone: [phone]  Fax: [phone]    Date of Service: 7/9/2020    Primary Care Physician: Suresh Fields MD.  Provider: Paul Blank MD       Reason for the visit:  Graves Hyperthyroidism, vitD deficiency     History of Present Illness: The history is provided by the patient. No  was used. Accuracy of the patient data is excellent. Donte Valdivia is a 29 y.o. male seen in the clinic today for follow up visit on graves hyperthyroidism     The patient was in his usual state health until July 2018 when started c/o intermittent palpitations, swelling in the area of the thyroid gland, unintentional weight loss, worsening ervousness, anxiety, irritability, tremor, sweating, heat intolerance, changes in bowel habits, muscle weakness and difficulty sleeping. Labs 9/26/2018:  TSH < 0.005, free T 7.89     10/2/2018 Thyroid US   Thyroid gland diffusely heterogenous  Rt lobe measures 6.5 x 2.8 x 2.7 cm --> increase vascularity no nodules  Lt lobe measures 5.9 c 2.4 x 3.3 cm --> increase vascularity, no nodules     10/23/2018 Thyroid Uptake and scan   Homogenous uptake, 24 hrs uptake was 79.9% consistent with graves disease    Pt was on Methimazole form 9/2018 to 8/2019   Methimazole was stopped in 8/2019 because very low thyroid hormones     Repeated labs on 5/8/2020 - TSH < 0.01, Free T4 3.3.8    Pt was restarted on methimazole and currently yon 15 mg daily     Pt told me that over the past 3-4 weeks was complaint with taking methimazole every morning (reported missing doses in the past)      He still Smokes 1/2 PPD for almost for 20 years     PAST MEDICAL HISTORY   Past Medical History:   Diagnosis Date    Hyperthyroidism     Vitamin D deficiency      PAST SURGICAL HISTORY   No past surgical history on file.   SOCIAL HISTORY   Tobacco:   reports that he neck deformity     CVS: no distress   Chest: no distress. Chest is moving with respiration    Extremities:  no visible tremor  Skin: No visible rashes as seen from camera   Musculoskeletal: no visible deformity  Neuro: Alert and oriented to person, place, and time. Psychiatric: Normal mood and affect.  Behavior is normal    Review of Laboratory Data:  I have reviewed the following:  Lab Results   Component Value Date/Time    WBC 4.7 01/14/2019 01:25 PM    RBC 4.85 01/14/2019 01:25 PM    HGB 13.0 01/14/2019 01:25 PM    HCT 39.9 01/14/2019 01:25 PM    MCV 82.3 01/14/2019 01:25 PM    MCH 26.8 01/14/2019 01:25 PM    MCHC 32.6 01/14/2019 01:25 PM    RDW 13.9 01/14/2019 01:25 PM     01/14/2019 01:25 PM    MPV 10.3 01/14/2019 01:25 PM      Lab Results   Component Value Date/Time     05/08/2020 11:25 AM    K 4.1 05/08/2020 11:25 AM    CO2 22 05/08/2020 11:25 AM    BUN 12 05/08/2020 11:25 AM    CALCIUM 10.1 05/08/2020 11:25 AM      Lab Results   Component Value Date/Time    TSH <0.010 (L) 05/08/2020 11:25 AM    T4FREE 3.38 (H) 05/08/2020 11:25 AM    P0TSEPS 3.2 (L) 09/03/2019 03:25 PM    R6KBHPP 342.10 (H) 05/08/2020 11:25 AM    TSI 9.37 (H) 05/08/2020 11:25 AM    TPOABS 135.7 (H) 05/08/2020 11:25 AM    THGAB <0.9 05/08/2020 11:25 AM     Lab Results   Component Value Date    GLUCOSE 110 05/08/2020    GLUCOSE 108 12/30/2011     Medical Records/Labs/Images review:   I personally reviewed and summarized previous records   All labs were reviewed independently     40 Skyla Mark Leninbridget, a 29 y.o.-old male seen in for management of hyperthyroidism     Graves Hyperthyroidism  · H/o poor compliance with taking methimazole but reported doing btter recently  · Currently oon Methimazole 15 mg daily   · Check TFT and adjust the dose if needed   · With graves eye disease and active smoking, I will avoid RODRIGUEZ ablation as a first option  · Reviewed potential rare but serious side effects of Methimazole, including agranulocytosis and liver dysfunction (cholestasis). Poor medication compliance   · Started getting better with taking medication as prescribed  · I had a long discussion with patient and explained the importance of taking thyroid medication as prescribed and importance of appropriately treating hyperthyroidism to prevent cardiac arrhythmia     vitD deficiency    · continue daily  vitD supplement   · Discussed the effect of hyperthyroidism on bone health    Return in about 4 months (around 11/9/2020) for Graves hyperthyroidism . The above issues were reviewed with the patient who understood and agreed with the plan. Thank you for allowing us to participate in the care of this patient. Please do not hesitate to contact us with any additional questions. Diagnosis Orders   1. Graves disease  TSH without Reflex    T4, Free    T3   2. Vitamin D deficiency     3. H/O medication noncompliance         Mary Alice Mason MD  Endocrinologist, [address], [address] [place] [address],[facility] [address] [id number]   Phone: [phone]  Fax: [phone]  ---------------------------------  An electronic signature was used to authenticate this note. Fam Whitehead MD on 7/9/2020 at 12:28 PM  Services were provided through a video synchronous discussion virtually to substitute for in-person clinic visit. Pursuant to the emergency declaration under the [facility], [address] waiver authority and the [employer] and [employer] Act, this Virtual  Visit was conducted, with patient's consent, to reduce the patient's risk of exposure to COVID-19 and provide continuity of care.

## 2020-07-23 ENCOUNTER — HOSPITAL ENCOUNTER (OUTPATIENT)
Age: 35
Discharge: HOME | End: 2020-07-23
Payer: COMMERCIAL

## 2020-07-23 LAB
T3 TOTAL: 96.33 NG/DL (ref 80–200)
T4 FREE: 0.82 NG/DL (ref 0.93–1.7)
TSH SERPL DL<=0.05 MIU/L-ACNC: <0.01 UIU/ML (ref 0.27–4.2)

## 2020-07-23 PROCEDURE — 84443 ASSAY THYROID STIM HORMONE: CPT

## 2020-07-23 PROCEDURE — 36415 COLL VENOUS BLD VENIPUNCTURE: CPT

## 2020-07-23 PROCEDURE — 84439 ASSAY OF FREE THYROXINE: CPT

## 2020-07-23 PROCEDURE — 84480 ASSAY TRIIODOTHYRONINE (T3): CPT

## 2020-07-26 ENCOUNTER — TELEPHONE (OUTPATIENT)
Dept: ENDOCRINOLOGY | Age: 35
End: 2020-07-26

## 2020-07-26 NOTE — TELEPHONE ENCOUNTER
Notify pt,  I have reviewed your recent results    Thyroid result was very consistent with missing doses of Methimazole    I recommend change Methimazole 10 mg from 1&1/2 tab daily to 1 tab daily and repeat labs again in last wk of 9/2020     Please make sure to take thyroid medication every day as prescribed

## 2020-07-28 NOTE — TELEPHONE ENCOUNTER
Left detailed message on pt's phone with 's message. Lab orders mailed to pt.          Electronically signed by Yasmeen Estrella MA on 7/28/2020 at 10:57 AM

## 2020-11-24 ENCOUNTER — OFFICE VISIT (OUTPATIENT)
Dept: ENDOCRINOLOGY | Age: 35
End: 2020-11-24
Payer: COMMERCIAL

## 2020-11-24 VITALS
WEIGHT: 193 LBS | OXYGEN SATURATION: 97 % | HEART RATE: 79 BPM | SYSTOLIC BLOOD PRESSURE: 132 MMHG | TEMPERATURE: 97.3 F | BODY MASS INDEX: 29.35 KG/M2 | DIASTOLIC BLOOD PRESSURE: 72 MMHG

## 2020-11-24 PROCEDURE — G8419 CALC BMI OUT NRM PARAM NOF/U: HCPCS | Performed by: INTERNAL MEDICINE

## 2020-11-24 PROCEDURE — G8484 FLU IMMUNIZE NO ADMIN: HCPCS | Performed by: INTERNAL MEDICINE

## 2020-11-24 PROCEDURE — 99214 OFFICE O/P EST MOD 30 MIN: CPT | Performed by: INTERNAL MEDICINE

## 2020-11-24 PROCEDURE — 4004F PT TOBACCO SCREEN RCVD TLK: CPT | Performed by: INTERNAL MEDICINE

## 2020-11-24 PROCEDURE — G8427 DOCREV CUR MEDS BY ELIG CLIN: HCPCS | Performed by: INTERNAL MEDICINE

## 2020-11-24 NOTE — PROGRESS NOTES
[address] [place] [address] Department of Endocrinology Diabetes and Metabolism   [address] [facility] [address]   Phone: [phone]  Fax: [phone]    Date of Service: 11/24/2020    Primary Care Physician: Fabby Rascon MD.  Provider: Juleen Dubin MD       Reason for the visit:  Graves Hyperthyroidism, vitD deficiency     History of Present Illness: The history is provided by the patient. No  was used. Accuracy of the patient data is excellent. Wendy Sauceda is a 28 y.o. male seen in the clinic today for follow up visit on graves hyperthyroidism     The patient was in his usual state health until July 2018 when started c/o intermittent palpitations, swelling in the area of the thyroid gland, unintentional weight loss, worsening ervousness, anxiety, irritability, tremor, sweating, heat intolerance, changes in bowel habits, muscle weakness and difficulty sleeping. Labs 9/26/2018:  TSH < 0.005, free T 7.89     10/2/2018 Thyroid US   Thyroid gland diffusely heterogenous  Rt lobe measures 6.5 x 2.8 x 2.7 cm --> increase vascularity no nodules  Lt lobe measures 5.9 c 2.4 x 3.3 cm --> increase vascularity, no nodules     10/23/2018 Thyroid Uptake and scan   Homogenous uptake, 24 hrs uptake was 79.9% consistent with graves disease    Pt was on Methimazole form 9/2018 to 8/2019   Methimazole was stopped in 8/2019 because very low thyroid hormones     Labs 7/2020 showed low TSH with low free T4 consistent with medication non compliance   Lab Results   Component Value Date/Time    TSH <0.010 (L) 07/23/2020 04:17 PM    T4FREE 0.82 (L) 07/23/2020 04:17 PM       Pt currently yon 15 mg daily. \  He still Smokes 1/2 PPD for almost for 20 years     PAST MEDICAL HISTORY   Past Medical History:   Diagnosis Date    Hyperthyroidism     Vitamin D deficiency      PAST SURGICAL HISTORY   No past surgical history on file.   SOCIAL HISTORY   Tobacco:   reports that he has been smoking cigarettes. He has been smoking about 0.50 packs per day. He has never used smokeless tobacco.  Alcol:   reports current alcohol use. Illicit Drugs:   reports current drug use. FAMILY HISTORY   Family History   Problem Relation Age of Onset    Hyperthyroidism Maternal Aunt      ALLERGIES AND DRUG REACTIONS   No Known Allergies    CURRENT MEDICATIONS     Current Outpatient Medications   Medication Sig Dispense Refill    methIMAzole (TAPAZOLE) 10 MG tablet TAKE 1 AND 1/2 TABLETS(15 MG) BY MOUTH DAILY 135 tablet 1     No current facility-administered medications for this visit. Review of Systems  Constitutional: No fever, no chills, no diaphoresis, no generalized weakness. HEENT: No blurred vision, No sore throat, no ear pain, no hair loss  Neck: denied any neck swelling, difficulty swallowing,   Cadrdiopulomary: No CP, SOB or palpitation, No orthopnea or PND. No cough or wheezing. GI: No N/V/D, no constipation, No abdominal pain, no melena or hematochezia   : Denied any dysuria, hematuria, flank pain, discharge, or incontinence. Skin: denied any rash, ulcer, Hirsute, or hyperpigmentation. MSK: denied any joint deformity, joint pain/swelling, muscle pain, or back pain. Neuro: no numbess, no tingling, no weakness,     OBJECTIVE    /72   Pulse 79   Temp 97.3 °F (36.3 °C)   Wt 193 lb (87.5 kg)   SpO2 97%   BMI 29.35 kg/m²   BP Readings from Last 4 Encounters:   11/24/20 132/72   09/04/19 (!) 139/96   08/21/19 136/82   03/11/19 112/72     Wt Readings from Last 6 Encounters:   11/24/20 193 lb (87.5 kg)   08/21/19 187 lb 12.8 oz (85.2 kg)   03/11/19 173 lb 6.4 oz (78.7 kg)   01/07/19 152 lb (68.9 kg)   11/09/18 165 lb (74.8 kg)   07/12/18 165 lb (74.8 kg)     Physical examination:  General: awake alert, oriented x3, no abnormal position or movements.    HEENT: normocephalic non traumatic, + exophthalmos, + lid lag, + lid retraction   Neck: supple, no LN enlargement, + thyromegaly, no thyroid tenderness, + thyroid bruit, no JVD. Pulm: Clear equal air entry no added sounds, no wheezing or rhonchi    CVS: S1 + S2, no murmur, no heave. Dorsalis pedis pulse palpable   Abd: soft lax, no tenderness, no organomegaly, audible bowel sounds. Skin: warm, no lesions, no rash.  No palmar erythema, no onycholysis, no pretibial Myxoedema, no acropachy    Neuro: CN intact, sensation notmal , muscle power normal. + tremors   Psych: normal mood, and affect    Review of Laboratory Data:  I have reviewed the following:  Lab Results   Component Value Date/Time    WBC 4.7 01/14/2019 01:25 PM    RBC 4.85 01/14/2019 01:25 PM    HGB 13.0 01/14/2019 01:25 PM    HCT 39.9 01/14/2019 01:25 PM    MCV 82.3 01/14/2019 01:25 PM    MCH 26.8 01/14/2019 01:25 PM    MCHC 32.6 01/14/2019 01:25 PM    RDW 13.9 01/14/2019 01:25 PM     01/14/2019 01:25 PM    MPV 10.3 01/14/2019 01:25 PM      Lab Results   Component Value Date/Time     05/08/2020 11:25 AM    K 4.1 05/08/2020 11:25 AM    CO2 22 05/08/2020 11:25 AM    BUN 12 05/08/2020 11:25 AM    CALCIUM 10.1 05/08/2020 11:25 AM      Lab Results   Component Value Date/Time    TSH <0.010 (L) 07/23/2020 04:17 PM    T4FREE 0.82 (L) 07/23/2020 04:17 PM    P0NOROU 3.2 (L) 09/03/2019 03:25 PM    N3JSBNY 96.33 07/23/2020 04:17 PM    TSI 9.37 (H) 05/08/2020 11:25 AM    TPOABS 135.7 (H) 05/08/2020 11:25 AM    THGAB <0.9 05/08/2020 11:25 AM     Lab Results   Component Value Date    GLUCOSE 110 05/08/2020    GLUCOSE 108 12/30/2011     Medical Records/Labs/Images review:   I personally reviewed and summarized previous records   All labs were reviewed independently     [address] Sk[address] Villa, a 28 y.o.-old male seen in for management of hyperthyroidism     Graves Hyperthyroidism  · H/o poor compliance with taking methimazole but reported doing better now   · Currently oon Methimazole 15 mg daily   · Check TFT and adjust the dose if needed   · With graves eye disease and active smoking, I will avoid RODRIGUEZ ablation as a first option  · Reviewed potential rare but serious side effects of Methimazole, including agranulocytosis and liver dysfunction (cholestasis). H/o Poor medication compliance   · Now  better with taking medication as prescribed  · I had a long discussion with patient and explained the importance of taking thyroid medication as prescribed and importance of appropriately treating hyperthyroidism to prevent cardiac arrhythmia     vitD deficiency    · continue daily  vitD supplement   · Discussed the effect of hyperthyroidism on bone health    Return in about 4 months (around 3/24/2021) for Graves disease . The above issues were reviewed with the patient who understood and agreed with the plan. Thank you for allowing us to participate in the care of this patient. Please do not hesitate to contact us with any additional questions. Diagnosis Orders   1. Graves disease  TSH without Reflex    T4, Free    T4   2. Vitamin D deficiency     3. H/O medication noncompliance         Shanti Cruz MD  Endocrinologist, [facility])   [address] [place] [address], [address] [place] [address],[facility] [id number]   Phone: [phone]  Fax: [phone]  ---------------------------------  An electronic signature was used to authenticate this note.  Sudeep Charles MD on 11/24/2020 at 11:25 AM

## 2020-12-03 ENCOUNTER — HOSPITAL ENCOUNTER (OUTPATIENT)
Age: 35
Discharge: HOME | End: 2020-12-03
Payer: COMMERCIAL

## 2020-12-03 LAB
T4 FREE: 0.56 NG/DL (ref 0.93–1.7)
T4 TOTAL: 3.4 MCG/DL (ref 4.5–11.7)
TSH SERPL DL<=0.05 MIU/L-ACNC: 13.37 UIU/ML (ref 0.27–4.2)

## 2020-12-03 PROCEDURE — 36415 COLL VENOUS BLD VENIPUNCTURE: CPT

## 2020-12-03 PROCEDURE — 84439 ASSAY OF FREE THYROXINE: CPT

## 2020-12-03 PROCEDURE — 84443 ASSAY THYROID STIM HORMONE: CPT

## 2020-12-05 ENCOUNTER — TELEPHONE (OUTPATIENT)
Dept: ENDOCRINOLOGY | Age: 35
End: 2020-12-05

## 2021-04-05 ENCOUNTER — VIRTUAL VISIT (OUTPATIENT)
Dept: ENDOCRINOLOGY | Age: 36
End: 2021-04-05
Payer: COMMERCIAL

## 2021-04-05 DIAGNOSIS — Z91.14 H/O MEDICATION NONCOMPLIANCE: ICD-10-CM

## 2021-04-05 DIAGNOSIS — E55.9 VITAMIN D DEFICIENCY: ICD-10-CM

## 2021-04-05 DIAGNOSIS — E05.00 GRAVES DISEASE: Primary | ICD-10-CM

## 2021-04-05 PROCEDURE — G8419 CALC BMI OUT NRM PARAM NOF/U: HCPCS | Performed by: INTERNAL MEDICINE

## 2021-04-05 PROCEDURE — G8428 CUR MEDS NOT DOCUMENT: HCPCS | Performed by: INTERNAL MEDICINE

## 2021-04-05 PROCEDURE — 99214 OFFICE O/P EST MOD 30 MIN: CPT | Performed by: INTERNAL MEDICINE

## 2021-04-05 PROCEDURE — 4004F PT TOBACCO SCREEN RCVD TLK: CPT | Performed by: INTERNAL MEDICINE

## 2021-04-05 NOTE — PROGRESS NOTES
[address] [place] [address] Department of Endocrinology Diabetes and Metabolism   [address] [facility] [address]   Phone: [phone]  Fax: [phone]    Date of Service: 4/5/2021    Primary Care Physician: Brianna Jimenez MD.  Provider: Adolfo Craft MD       Reason for the visit:  Graves Hyperthyroidism, vitD deficiency     History of Present Illness: The history is provided by the patient. No  was used. Accuracy of the patient data is excellent. Amelia Acevedo is a 28 y.o. male seen in the clinic today for follow up visit on graves hyperthyroidism     The patient was in his usual state health until July 2018 when started c/o intermittent palpitations, swelling in the area of the thyroid gland, unintentional weight loss, worsening ervousness, anxiety, irritability, tremor, sweating, heat intolerance, changes in bowel habits, muscle weakness and difficulty sleeping.   Labs 9/26/2018:  TSH < 0.005, free T 7.89     10/2/2018 Thyroid US   Thyroid gland diffusely heterogenous  Rt lobe measures 6.5 x 2.8 x 2.7 cm --> increase vascularity no nodules  Lt lobe measures 5.9 c 2.4 x 3.3 cm --> increase vascularity, no nodules     10/23/2018 Thyroid Uptake and scan   Homogenous uptake, 24 hrs uptake was 79.9% consistent with graves disease    Pt was on Methimazole form 9/2018 to 8/2019   Methimazole was stopped in 8/2019 because very low thyroid hormones     Labs 7/2020 showed low TSH with low free T4 consistent with medication non compliance   Lab Results   Component Value Date/Time    TSH <0.010 (L) 07/23/2020 04:17 PM    T4FREE 0.82 (L) 07/23/2020 04:17 PM       Pt currently yon 10 mg daily, dose was decreased from 15 mg daily in 12/2020 as TSH was elevated   Lab Results   Component Value Date/Time    TSH 13.370 (H) 12/03/2020 10:28 AM    T4FREE 0.56 (L) 12/03/2020 10:28 AM     He still Smokes 1/2 PPD for almost for 20 years     PAST MEDICAL HISTORY   Past Medical History:   Diagnosis Date    Hyperthyroidism     Vitamin D deficiency      PAST SURGICAL HISTORY   No past surgical history on file. SOCIAL HISTORY   Tobacco:   reports that he has been smoking cigarettes. He has been smoking about 0.50 packs per day. He has never used smokeless tobacco.  Alcol:   reports current alcohol use. Illicit Drugs:   reports current drug use. FAMILY HISTORY   Family History   Problem Relation Age of Onset    Hyperthyroidism Maternal Aunt      ALLERGIES AND DRUG REACTIONS   No Known Allergies    CURRENT MEDICATIONS     Current Outpatient Medications   Medication Sig Dispense Refill    methIMAzole (TAPAZOLE) 10 MG tablet TAKE 1 AND 1/2 TABLETS(15 MG) BY MOUTH DAILY (Patient taking differently: 10 mg daily ) 135 tablet 1     No current facility-administered medications for this visit. Review of Systems  Constitutional: No fever, no chills, no diaphoresis, no generalized weakness. HEENT: No blurred vision, No sore throat, no ear pain, no hair loss  Neck: denied any neck swelling, difficulty swallowing,   Cadrdiopulomary: No CP, SOB or palpitation, No orthopnea or PND. No cough or wheezing. GI: No N/V/D, no constipation, No abdominal pain, no melena or hematochezia   : Denied any dysuria, hematuria, flank pain, discharge, or incontinence. Skin: denied any rash, ulcer, Hirsute, or hyperpigmentation. MSK: denied any joint deformity, joint pain/swelling, muscle pain, or back pain. Neuro: no numbess, no tingling, no weakness,     OBJECTIVE    There were no vitals taken for this visit.   BP Readings from Last 4 Encounters:   11/24/20 132/72   09/04/19 (!) 139/96   08/21/19 136/82   03/11/19 112/72     Wt Readings from Last 6 Encounters:   11/24/20 193 lb (87.5 kg)   08/21/19 187 lb 12.8 oz (85.2 kg)   03/11/19 173 lb 6.4 oz (78.7 kg)   01/07/19 152 lb (68.9 kg)   11/09/18 165 lb (74.8 kg)   07/12/18 165 lb (74.8 kg)     Physical examination:  Due to this being a TeleHealth encounter, evaluation of the following organ systems is limited: Vitals/Constitutional/EENT/Resp/CV/GI//MS/Neuro/Skin/Heme-Lymph-Imm. Modified physical exam through Telemedicine camera    General: Communicating well via camera   Neck: no obvious neck mass. No obvious neck deformity     CVS: no distress   Chest: no distress. Chest is moving with respiration    Extremities:  no visible tremor  Skin: No visible rashes as seen from camera   Musculoskeletal: no visible deformity  Neuro: Alert and oriented to person, place, and time. Psychiatric: Normal mood and affect.  Behavior is normal    Review of Laboratory Data:  I have reviewed the following:  Lab Results   Component Value Date/Time    WBC 4.7 01/14/2019 01:25 PM    RBC 4.85 01/14/2019 01:25 PM    HGB 13.0 01/14/2019 01:25 PM    HCT 39.9 01/14/2019 01:25 PM    MCV 82.3 01/14/2019 01:25 PM    MCH 26.8 01/14/2019 01:25 PM    MCHC 32.6 01/14/2019 01:25 PM    RDW 13.9 01/14/2019 01:25 PM     01/14/2019 01:25 PM    MPV 10.3 01/14/2019 01:25 PM      Lab Results   Component Value Date/Time     05/08/2020 11:25 AM    K 4.1 05/08/2020 11:25 AM    CO2 22 05/08/2020 11:25 AM    BUN 12 05/08/2020 11:25 AM    CALCIUM 10.1 05/08/2020 11:25 AM      Lab Results   Component Value Date/Time    TSH 13.370 (H) 12/03/2020 10:28 AM    T4FREE 0.56 (L) 12/03/2020 10:28 AM    D6OWSDM 3.4 (L) 12/03/2020 10:28 AM    Z4INMRM 96.33 07/23/2020 04:17 PM    TSI 9.37 (H) 05/08/2020 11:25 AM    TPOABS 135.7 (H) 05/08/2020 11:25 AM    THGAB <0.9 05/08/2020 11:25 AM     Lab Results   Component Value Date    GLUCOSE 110 05/08/2020    GLUCOSE 108 12/30/2011     ASSESSMENT & RECOMMENDATIONS   Mikhail Hernandez, a 28 y.o.-old male seen in for management of hyperthyroidism     Graves Hyperthyroidism  · Pt doing better now with medication compliance   · Currently oon Methimazole 10 mg daily   · Check TFT and adjust the dose if needed   · With graves eye disease and active smoking, I will avoid RODRIGUEZ ablation as a first option  · Reviewed potential rare but serious side effects of Methimazole, including agranulocytosis and liver dysfunction (cholestasis). H/o Poor medication compliance   · Now  better with taking medication now   · I had a long discussion with patient and explained the importance of taking thyroid medication as prescribed and importance of appropriately treating hyperthyroidism to prevent cardiac arrhythmia     vitD deficiency    · continue daily  vitD supplement   · Discussed the effect of hyperthyroidism on bone health    I personally reviewed external notes from PCP and other patient's care team providers, and personally interpreted labs associated with the above diagnosis. I also ordered labs to further assess and manage the above addressed medical conditions    Return in about 4 months (around 8/5/2021) for Hyperthyroidism, VitD deficiency. The above issues were reviewed with the patient who understood and agreed with the plan. Thank you for allowing us to participate in the care of this patient. Please do not hesitate to contact us with any additional questions. Diagnosis Orders   1. Graves disease  TSH without Reflex    T4, Free    T4   2. Vitamin D deficiency     3. H/O medication noncompliance         Nasrin Gastelum MD  Endocrinologist, [facility])   [address] [place] [address], [address] [place] [address],[id number]   Phone: [phone]  Fax: [phone]  ---------------------------------  An electronic signature was used to authenticate this note. Alexa Payne MD on 4/5/2021 at 9:20 AM    This visit was performed as a virtual video visit using a synchronous, two-way, audio-video telehealth technology platform  This Virtual  Visit was conducted, with patient's consent, to reduce the patient's risk of exposure to COVID-19 and provide continuity of care.

## 2021-04-05 NOTE — PROGRESS NOTES
Toya Thao was read the following message We want to confirm that, for purposes of billing, this is a virtual visit with your provider for which we will submit a claim for reimbursement with your insurance company. You will be responsible for any copays, coinsurance amounts or other amounts not covered by your insurance company. If you do not accept this, unfortunately we will not be able to schedule or proceed with a virtual visit with the provider. Do you accept? Mikhail responded Yes .

## 2021-04-14 ENCOUNTER — HOSPITAL ENCOUNTER (OUTPATIENT)
Age: 36
Discharge: HOME | End: 2021-04-14
Payer: COMMERCIAL

## 2021-04-14 DIAGNOSIS — E05.00 GRAVES DISEASE: ICD-10-CM

## 2021-04-14 LAB
T4 FREE: 0.5 NG/DL (ref 0.93–1.7)
T4 TOTAL: 2.6 MCG/DL (ref 4.5–11.7)
TSH SERPL DL<=0.05 MIU/L-ACNC: 10.64 UIU/ML (ref 0.27–4.2)

## 2021-04-14 PROCEDURE — 84443 ASSAY THYROID STIM HORMONE: CPT

## 2021-04-14 PROCEDURE — 84439 ASSAY OF FREE THYROXINE: CPT

## 2021-04-14 PROCEDURE — 36415 COLL VENOUS BLD VENIPUNCTURE: CPT

## 2021-04-19 ENCOUNTER — TELEPHONE (OUTPATIENT)
Dept: ENDOCRINOLOGY | Age: 36
End: 2021-04-19

## 2021-04-19 DIAGNOSIS — E05.00 GRAVES DISEASE: Primary | ICD-10-CM

## 2021-04-19 RX ORDER — METHIMAZOLE 5 MG/1
TABLET ORAL
Qty: 45 TABLET | Refills: 1 | Status: SHIPPED
Start: 2021-04-19 | End: 2021-08-11 | Stop reason: SDUPTHER

## 2021-04-20 NOTE — TELEPHONE ENCOUNTER
Notify pt,  I have reviewed your recent results    Thyroid hormones are low    Decrease Methimazole from 10 mg 1 tab daily to 5 mg 0.5 tab (2.5 mg) daily     Repeat labs before next visit

## 2021-04-26 NOTE — PROGRESS NOTES
education: Not on file    Highest education level: Not on file   Occupational History    Not on file   Social Needs    Financial resource strain: Not on file    Food insecurity:     Worry: Not on file     Inability: Not on file    Transportation needs:     Medical: Not on file     Non-medical: Not on file   Tobacco Use    Smoking status: Current Every Day Smoker     Packs/day: 0.50     Types: Cigarettes    Smokeless tobacco: Never Used   Substance and Sexual Activity    Alcohol use: Yes     Comment: occasionally    Drug use: Yes     Comment: couple weeks ago    Sexual activity: Not on file   Lifestyle    Physical activity:     Days per week: Not on file     Minutes per session: Not on file    Stress: Not on file   Relationships    Social connections:     Talks on phone: Not on file     Gets together: Not on file     Attends Restorationist service: Not on file     Active member of club or organization: Not on file     Attends meetings of clubs or organizations: Not on file     Relationship status: Not on file    Intimate partner violence:     Fear of current or ex partner: Not on file     Emotionally abused: Not on file     Physically abused: Not on file     Forced sexual activity: Not on file   Other Topics Concern    Not on file   Social History Narrative    Not on file     FAMILY HISTORY   Family History   Problem Relation Age of Onset    Hyperthyroidism Maternal Aunt      ALLERGIES AND DRUG REACTIONS   No Known Allergies    CURRENT MEDICATIONS     Current Outpatient Medications   Medication Sig Dispense Refill    methimazole (TAPAZOLE) 10 MG tablet Take 1.5 tablets by mouth 2 times daily (Patient taking differently: Take 10 mg by mouth 2 times daily ) 90 tablet 12     No current facility-administered medications for this visit. Review of Systems  Constitutional: No fever, no chills, no diaphoresis, no generalized weakness.   HEENT: No blurred vision, No sore throat, no ear pain, no hair 72

## 2021-05-12 ENCOUNTER — TELEPHONE (OUTPATIENT)
Dept: ENDOCRINOLOGY | Age: 36
End: 2021-05-12

## 2021-05-12 NOTE — TELEPHONE ENCOUNTER
Patient called back today regarding lab work done in April. Patient was advised thyroid low and Methimazole dose needs adjusted. Patient to stop Methimazole 10 mg QD and begin Methimazole 5 mg 1/2 tab QD. Patient was told new  RX was sent to pharmacy in April. He will begin new dose. Patient advised to have lab work done prior to [address] 8/11/21.

## 2021-05-14 NOTE — TELEPHONE ENCOUNTER
I called and spoke with Luis Medina, gave him the dosage of his Methimazole again and reminded him to get his labs prior to his next appointment. Gave him time and date of next appointment. He asked about his PCP and should he make appointment with them, I stated he would have to call and discuss that with their office. I asked if he had any other questions regarding his thyroid, he stated no he was good. Call ended.

## 2021-08-11 DIAGNOSIS — E05.00 GRAVES DISEASE: ICD-10-CM

## 2021-08-11 RX ORDER — METHIMAZOLE 5 MG/1
TABLET ORAL
Qty: 45 TABLET | Refills: 1 | Status: SHIPPED
Start: 2021-08-11 | End: 2022-01-05 | Stop reason: SDUPTHER

## 2021-09-01 ENCOUNTER — OFFICE VISIT (OUTPATIENT)
Dept: ENDOCRINOLOGY | Age: 36
End: 2021-09-01
Payer: COMMERCIAL

## 2021-09-01 VITALS
DIASTOLIC BLOOD PRESSURE: 93 MMHG | HEIGHT: 68 IN | BODY MASS INDEX: 28.34 KG/M2 | OXYGEN SATURATION: 95 % | WEIGHT: 187 LBS | SYSTOLIC BLOOD PRESSURE: 137 MMHG | HEART RATE: 71 BPM

## 2021-09-01 DIAGNOSIS — Z91.14 H/O MEDICATION NONCOMPLIANCE: ICD-10-CM

## 2021-09-01 DIAGNOSIS — E05.00 GRAVES DISEASE: Primary | ICD-10-CM

## 2021-09-01 DIAGNOSIS — E55.9 VITAMIN D DEFICIENCY: ICD-10-CM

## 2021-09-01 PROCEDURE — G8427 DOCREV CUR MEDS BY ELIG CLIN: HCPCS | Performed by: CLINICAL NURSE SPECIALIST

## 2021-09-01 PROCEDURE — G8419 CALC BMI OUT NRM PARAM NOF/U: HCPCS | Performed by: CLINICAL NURSE SPECIALIST

## 2021-09-01 PROCEDURE — 99213 OFFICE O/P EST LOW 20 MIN: CPT | Performed by: CLINICAL NURSE SPECIALIST

## 2021-09-01 PROCEDURE — 4004F PT TOBACCO SCREEN RCVD TLK: CPT | Performed by: CLINICAL NURSE SPECIALIST

## 2021-09-01 NOTE — PROGRESS NOTES
[address] [place] [address] Department of Endocrinology Diabetes and Metabolism   [address] [address]   Phone: [phone]  Fax: [phone]    Date of Service: 9/1/2021    Primary Care Physician: Camden Villavicencio MD  Referring physician: No ref. provider found  Provider: Nerissa TORRES    Reason for the visit:  Graves Hyperthyroidism, vitD deficiency      History of Present Illness: The history is provided by the patient. No  was used. Accuracy of the patient data is excellent. Alan Haile is a 39 y.o. male seen in the clinic today for follow up visit on graves hyperthyroidism      The patient was in his usual state health until July 2018 when started c/o intermittent palpitations, swelling in the area of the thyroid gland, unintentional weight loss, worsening nervousness, anxiety, irritability, tremor, sweating, heat intolerance, changes in bowel habits, muscle weakness and difficulty sleeping. Labs 9/26/2018:  TSH < 0.005, free T 7.89         10/2/2018 Thyroid US   Thyroid gland diffusely heterogenous  Rt lobe measures 6.5 x 2.8 x 2.7 cm --> increase vascularity no nodules  Lt lobe measures 5.9 c 2.4 x 3.3 cm --> increase vascularity, no nodules      10/23/2018 Thyroid Uptake and scan   Homogenous uptake, 24 hrs uptake was 79.9% consistent with graves disease     Pt was on Methimazole form 9/2018 to 8/2019   Methimazole was stopped in 8/2019 because very low thyroid hormones      Labs 7/2020 showed low TSH with low free T4 consistent with medication non compliance   Most recent TSH 10.6, free T4 0.5, total T4 2.6 (4/21) dose was decreased to 2.5 mg daily.   Patient was confused and taking 10 mg half a tablet until approximately 1.5 to 2 months ago and he decreased dose to 2.5 mg daily       Most recent thyroid levels   Lab Results   Component Value Date    TSH 10.640 (H) 04/14/2021    N9IGPKD 96.33 07/23/2020    F0DJFNI 2.6 (L) 04/14/2021 T4FREE 0.50 (L) 04/14/2021     Methimazole dose decreased from 10 to 5 mg 1/2 tablet (2.5mg) daily at that point   Has been taking the 2.5 mg for last 1.5 to 2 months . Prior to that taking 10 mg tablet as he was not sure on change in dose   PAST MEDICAL HISTORY   Past Medical History:   Diagnosis Date    Hyperthyroidism     Vitamin D deficiency        PAST SURGICAL HISTORY   No past surgical history on file. SOCIAL HISTORY   Tobacco:   reports that he has been smoking cigarettes. He has been smoking about 0.50 packs per day. He has never used smokeless tobacco.  Alcohol:   reports current alcohol use. Drugs:   reports current drug use. FAMILY HISTORY   Family History   Problem Relation Age of Onset    Hyperthyroidism Maternal Aunt        ALLERGIES AND DRUG REACTIONS   No Known Allergies    CURRENT MEDICATIONS   Current Outpatient Medications   Medication Sig Dispense Refill    methIMAzole (TAPAZOLE) 5 MG tablet Take 0.5 tablet daily 45 tablet 1     No current facility-administered medications for this visit. Review of Systems  Constitutional: No fever, no chills, no diaphoresis, no generalized weakness. HEENT: No blurred vision, No sore throat, no ear pain, no hair loss  Neck: denied any neck swelling, difficulty swallowing,   Cardio-pulmonary: No CP, SOB or palpitation, No orthopnea or PND. No cough or wheezing. GI: No N/V/D, no constipation, No abdominal pain, no melena or hematochezia   : Denied any dysuria, hematuria, flank pain, discharge, or incontinence. Skin: denied any rash, ulcer, Hirsute, or hyperpigmentation. MSK: denied any joint deformity, joint pain/swelling, muscle pain, or back pain.   Neuro: no numbness, no tingling, no weakness, _    OBJECTIVE    BP (!) 137/93   Pulse 71   Ht 5' 8\" (1.727 m)   Wt 187 lb (84.8 kg)   SpO2 95%   BMI 28.43 kg/m²   BP Readings from Last 4 Encounters:   09/01/21 (!) 137/93   11/24/20 132/72   09/04/19 (!) 139/96   08/21/19 136/82     Wt Readings from Last 6 Encounters:   09/01/21 187 lb (84.8 kg)   11/24/20 193 lb (87.5 kg)   08/21/19 187 lb 12.8 oz (85.2 kg)   03/11/19 173 lb 6.4 oz (78.7 kg)   01/07/19 152 lb (68.9 kg)   11/09/18 165 lb (74.8 kg)       Physical examination:  General: awake alert, oriented x3, no abnormal position or movements. HEENT: normocephalic non-traumatic, + bilateral  exophthalmos   Neck: supple, no LN enlargement, no thyromegaly, no thyroid tenderness, no JVD. Pulm: Clear equal air entry no added sounds, no wheezing or rhonchi    CVS: S1 + S2, no murmur, no heave. Dorsalis pedis pulse palpable   Abd: soft lax, no tenderness, no organomegaly, audible bowel sounds. Skin: warm, no lesions, no rash.   Musculoskeletal: No back tenderness, no kyphosis/scoliosis    Neuro: CN intact, , muscle power normal  Psych: normal mood, and affect      Review of Laboratory Data:  I personally reviewed the following lab:  Lab Results   Component Value Date/Time    WBC 4.7 01/14/2019 01:25 PM    RBC 4.85 01/14/2019 01:25 PM    HGB 13.0 01/14/2019 01:25 PM    HCT 39.9 01/14/2019 01:25 PM    MCV 82.3 01/14/2019 01:25 PM    MCH 26.8 01/14/2019 01:25 PM    MCHC 32.6 01/14/2019 01:25 PM    RDW 13.9 01/14/2019 01:25 PM     01/14/2019 01:25 PM    MPV 10.3 01/14/2019 01:25 PM      Lab Results   Component Value Date/Time     05/08/2020 11:25 AM    K 4.1 05/08/2020 11:25 AM    CO2 22 05/08/2020 11:25 AM    BUN 12 05/08/2020 11:25 AM    CREATININE 0.8 05/08/2020 11:25 AM    CALCIUM 10.1 05/08/2020 11:25 AM    LABGLOM >60 05/08/2020 11:25 AM    GFRAA >60 05/08/2020 11:25 AM      Lab Results   Component Value Date/Time    TSH 10.640 (H) 04/14/2021 01:20 PM    T4FREE 0.50 (L) 04/14/2021 01:20 PM    H6SDYAX 2.6 (L) 04/14/2021 01:20 PM    K7RGEWV 96.33 07/23/2020 04:17 PM    TSI 9.37 (H) 05/08/2020 11:25 AM    TPOABS 135.7 (H) 05/08/2020 11:25 AM    THGAB <0.9 05/08/2020 11:25 AM     Lab Results   Component Value Date    GLUCOSE 110 05/08/2020    GLUCOSE 108 12/30/2011     No results found for: LABA1C  No results found for: TRIG, HDL, LDLCALC, CHOL  Lab Results   Component Value Date    VITD25 26 05/08/2020    VITD25 15 01/14/2019       ASSESSMENT & RECOMMENDATIONS   Darren Despina Siemens, a 39 y.o.-old male seen in for the following issues       Assessment:      Diagnosis Orders   1. Graves disease  T4, Free    TSH without Reflex    T3, Free   2. Vitamin D deficiency  Vitamin D 25 Hydroxy   3. H/O medication noncompliance         Plan:     1. Graves disease   Patient denies symptoms of hyperthyroidism at this point  Will reassess TSH, free T4, and free T3  Continue methimazole 2.5 mg daily for now  Counseled on side effects of methimazole use. Advised patient to stop methimazole and let us know if any high fever, dark-colored urine, or severe abdominal pain occurs  Further recommendations will be made after results are obtained  I do not recommend radioactive iodine at this point due to Graves eye disease  Counseled on symptoms hyperthyroidism and hypothyroidism  Patient verbalized understanding. 2. Vitamin D deficiency   Will repeat vitamin D level. Patient is not on any vitamin D at this point   3. H/O medication noncompliance   Encourage compliance         No follow-ups on file. The above issues were reviewed with the patient who understood and agreed with the plan. Thank you for allowing us to participate in the care of this patient. Please do not hesitate to contact us with any additional questions. Rodo TORRES    [facility] Diabetes Care and Endocrinology   [address] [place] [id number]   Phone: [phone]  Fax: [phone]  --------------------------------------------  An electronic signature was used to authenticate this note.  Rodo TORRES on 9/1/2021 at 4:00 PM

## 2021-09-02 ENCOUNTER — TELEPHONE (OUTPATIENT)
Dept: ENDOCRINOLOGY | Age: 36
End: 2021-09-02

## 2021-09-02 DIAGNOSIS — E05.00 GRAVES DISEASE: Primary | ICD-10-CM

## 2021-09-02 RX ORDER — ERGOCALCIFEROL (VITAMIN D2) 1250 MCG
50000 CAPSULE ORAL WEEKLY
Qty: 12 CAPSULE | Refills: 0 | Status: SHIPPED
Start: 2021-09-02 | End: 2022-05-05

## 2021-09-02 NOTE — TELEPHONE ENCOUNTER
----- Message from HILARIA Wilder - CNS sent at 9/2/2021  8:12 AM EDT -----  Please call patient and inform him thyroid function is now normal.  Please have him continue methimazole for now. Will recheck thyroid function test along with TSH receptor antibody in 3 months. If TSH receptor antibody negative and thyroid function test normal will attempt to stop methimazole at that time. L:abs ordered please mail  His vitamin D is significantly low. I advised patient to start Drisdol 50,000 international units 1 tablet once weekly for 12 weeks.   Prescription sent

## 2021-09-07 ENCOUNTER — TELEPHONE (OUTPATIENT)
Dept: ENDOCRINOLOGY | Age: 36
End: 2021-09-07

## 2021-09-07 NOTE — TELEPHONE ENCOUNTER
----- Message from HILARIA Guerin - CNS sent at 9/2/2021  8:12 AM EDT -----  Please call patient and inform him thyroid function is now normal.  Please have him continue methimazole for now. Will recheck thyroid function test along with TSH receptor antibody in 3 months. If TSH receptor antibody negative and thyroid function test normal will attempt to stop methimazole at that time. L:abs ordered please mail  His vitamin D is significantly low. I advised patient to start Drisdol 50,000 international units 1 tablet once weekly for 12 weeks.   Prescription sent

## 2022-01-05 ENCOUNTER — OFFICE VISIT (OUTPATIENT)
Dept: ENDOCRINOLOGY | Age: 37
End: 2022-01-05
Payer: COMMERCIAL

## 2022-01-05 VITALS
BODY MASS INDEX: 28.34 KG/M2 | HEART RATE: 76 BPM | HEIGHT: 68 IN | WEIGHT: 187 LBS | SYSTOLIC BLOOD PRESSURE: 140 MMHG | OXYGEN SATURATION: 98 % | DIASTOLIC BLOOD PRESSURE: 93 MMHG

## 2022-01-05 DIAGNOSIS — E04.2 MULTINODULAR GOITER: ICD-10-CM

## 2022-01-05 DIAGNOSIS — E05.00 GRAVES DISEASE: ICD-10-CM

## 2022-01-05 DIAGNOSIS — E05.00 GRAVES DISEASE: Primary | ICD-10-CM

## 2022-01-05 DIAGNOSIS — E55.9 VITAMIN D DEFICIENCY: ICD-10-CM

## 2022-01-05 LAB
ANION GAP SERPL CALCULATED.3IONS-SCNC: 16 MMOL/L (ref 7–16)
BUN BLDV-MCNC: 10 MG/DL (ref 6–20)
CALCIUM SERPL-MCNC: 9.4 MG/DL (ref 8.6–10.2)
CHLORIDE BLD-SCNC: 107 MMOL/L (ref 98–107)
CO2: 22 MMOL/L (ref 22–29)
CREAT SERPL-MCNC: 1.4 MG/DL (ref 0.7–1.2)
GFR AFRICAN AMERICAN: >60
GFR NON-AFRICAN AMERICAN: >60 ML/MIN/1.73
GLUCOSE BLD-MCNC: 95 MG/DL (ref 74–99)
POTASSIUM SERPL-SCNC: 4.6 MMOL/L (ref 3.5–5)
SODIUM BLD-SCNC: 145 MMOL/L (ref 132–146)
T4 FREE: 1.06 NG/DL (ref 0.93–1.7)
TSH SERPL DL<=0.05 MIU/L-ACNC: 1.39 UIU/ML (ref 0.27–4.2)
VITAMIN D 25-HYDROXY: 20 NG/ML (ref 30–100)

## 2022-01-05 PROCEDURE — 99214 OFFICE O/P EST MOD 30 MIN: CPT | Performed by: INTERNAL MEDICINE

## 2022-01-05 PROCEDURE — G8484 FLU IMMUNIZE NO ADMIN: HCPCS | Performed by: INTERNAL MEDICINE

## 2022-01-05 PROCEDURE — G8419 CALC BMI OUT NRM PARAM NOF/U: HCPCS | Performed by: INTERNAL MEDICINE

## 2022-01-05 PROCEDURE — G8427 DOCREV CUR MEDS BY ELIG CLIN: HCPCS | Performed by: INTERNAL MEDICINE

## 2022-01-05 PROCEDURE — 4004F PT TOBACCO SCREEN RCVD TLK: CPT | Performed by: INTERNAL MEDICINE

## 2022-01-05 RX ORDER — METHIMAZOLE 5 MG/1
TABLET ORAL
Qty: 45 TABLET | Refills: 3 | Status: SHIPPED
Start: 2022-01-05 | End: 2022-08-23 | Stop reason: SDUPTHER

## 2022-01-05 NOTE — PROGRESS NOTES
[address] [place] [address] Department of Endocrinology Diabetes and Metabolism   [address] [facility] [address]   Phone: [phone]  Fax: [phone]    Date of Service: 1/5/2022  Primary Care Physician: Cedric Castillo MD.  Provider: Chastity Hodges MD       Reason for the visit:  Graves Hyperthyroidism, vitD deficiency     History of Present Illness: The history is provided by the patient. No  was used. Accuracy of the patient data is excellent. Adriel Nathan is a 39 y.o. male seen in the clinic today for follow up visit on graves hyperthyroidism     The patient was in his usual state health until July 2018 when started c/o intermittent palpitations, swelling in the area of the thyroid gland, unintentional weight loss, worsening ervousness, anxiety, irritability, tremor, sweating, heat intolerance, changes in bowel habits, muscle weakness and difficulty sleeping.   Labs 9/26/2018:  TSH < 0.005, free T 7.89     10/2/2018 Thyroid US   Thyroid gland diffusely heterogenous  Rt lobe measures 6.5 x 2.8 x 2.7 cm --> increase vascularity no nodules  Lt lobe measures 5.9 c 2.4 x 3.3 cm --> increase vascularity, no nodules     10/23/2018 Thyroid Uptake and scan   Homogenous uptake, 24 hrs uptake was 79.9% consistent with graves disease    Pt was on Methimazole form 9/2018 to 8/2019   Methimazole was stopped in 8/2019 because very low thyroid hormones     Labs 7/2020 showed low TSH with low free T4 consistent with medication non compliance   Lab Results   Component Value Date/Time    TSH <0.010 (L) 07/23/2020 04:17 PM    T4FREE 0.82 (L) 07/23/2020 04:17 PM       Pt currently yon 10 mg daily, dose was decreased from 15 mg daily in 12/2020 as TSH was elevated   Lab Results   Component Value Date/Time    TSH 13.370 (H) 12/03/2020 10:28 AM    T4FREE 0.56 (L) 12/03/2020 10:28 AM     He still Smokes 1/2 PPD for almost for 20 years     PAST MEDICAL HISTORY   Past Medical History: (78.7 kg)   01/07/19 152 lb (68.9 kg)     Physical examination:  General: awake alert, oriented x3  HEENT: normocephalic non traumatic, no exophthalmos   Neck: supple, thyroid tenderness,  Pulm: Clear equal air entry no added sounds  CVS: S1 + S2  Abd: soft lax, no tenderness  Skin: warm, no lesions, no rash. Neuro: CN intact, , muscle power normal  Psych: normal mood, and affect    Review of Laboratory Data:  I have reviewed the following:  Lab Results   Component Value Date/Time    WBC 4.7 01/14/2019 01:25 PM    RBC 4.85 01/14/2019 01:25 PM    HGB 13.0 01/14/2019 01:25 PM    HCT 39.9 01/14/2019 01:25 PM    MCV 82.3 01/14/2019 01:25 PM    MCH 26.8 01/14/2019 01:25 PM    MCHC 32.6 01/14/2019 01:25 PM    RDW 13.9 01/14/2019 01:25 PM     01/14/2019 01:25 PM    MPV 10.3 01/14/2019 01:25 PM      Lab Results   Component Value Date/Time     05/08/2020 11:25 AM    K 4.1 05/08/2020 11:25 AM    CO2 22 05/08/2020 11:25 AM    BUN 12 05/08/2020 11:25 AM    CALCIUM 10.1 05/08/2020 11:25 AM      Lab Results   Component Value Date/Time    TSH 1.840 09/01/2021 12:00 PM    T4FREE 1.00 09/01/2021 12:00 PM    W3AABQQ 2.6 (L) 04/14/2021 01:20 PM    FT3 3.2 09/01/2021 12:00 PM    A9OLVPO 96.33 07/23/2020 04:17 PM    TSI 9.37 (H) 05/08/2020 11:25 AM    TPOABS 135.7 (H) 05/08/2020 11:25 AM    THGAB <0.9 05/08/2020 11:25 AM     Lab Results   Component Value Date    GLUCOSE 110 05/08/2020    GLUCOSE 108 12/30/2011     ASSESSMENT & RECOMMENDATIONS   Mikhail Crisostomo, a 39 y.o.-old male seen in for management of hyperthyroidism     Graves Hyperthyroidism  · Pt doing better now with medication compliance   · Currently oon Methimazole 2.5 mg daily   · Check TFT and adjust the dose if needed   · With graves eye disease and active smoking, I will avoid RODRIGUEZ ablation as a first option  · Reviewed potential rare but serious side effects of Methimazole, including agranulocytosis and liver dysfunction (cholestasis).     H/o Poor medication compliance   · Now  better with taking medication now   · I had a long discussion with patient and explained the importance of taking thyroid medication as prescribed and importance of appropriately treating hyperthyroidism to prevent cardiac arrhythmia     vitD deficiency    · continue daily  vitD supplement   · Discussed the effect of hyperthyroidism on bone health    I personally reviewed external notes from PCP and other patient's care team providers, and personally interpreted labs associated with the above diagnosis. I also ordered labs to further assess and manage the above addressed medical conditions    Return in about 4 months (around 5/5/2022) for Hyperthyroidism, VitD deficiency. The above issues were reviewed with the patient who understood and agreed with the plan. Thank you for allowing us to participate in the care of this patient. Please do not hesitate to contact us with any additional questions. Diagnosis Orders   1. Graves disease  TSH without Reflex    T4, Free    methIMAzole (TAPAZOLE) 5 MG tablet   2. Vitamin D deficiency  Vitamin D 25 Hydroxy    Basic Metabolic Panel   3. Multinodular goiter         Faby Silva MD  Endocrinologist, [facility])   [address] [place] [address], [address] [place] [address],[facility] [id number]   Phone: [phone]  Fax: [phone]  ---------------------------------  An electronic signature was used to authenticate this note.  Gallito Martinez MD on 1/5/2022 at 2:20 PM

## 2022-01-06 ENCOUNTER — TELEPHONE (OUTPATIENT)
Dept: ENDOCRINOLOGY | Age: 37
End: 2022-01-06

## 2022-02-28 NOTE — TELEPHONE ENCOUNTER
Cuauhtemoc Cisneros has been taking 1 tablet of his Methimazole 5mg rather than the 1/2 a tablet that you prescribed. I talked with him to verify that his prescription bottle is correct he just did not read it correctly. Please advise what should be done from here? Thank you!

## 2022-03-04 NOTE — TELEPHONE ENCOUNTER
Just continue what he is currently doing 1 tab daily and recheck level in 6-8 weeks     Please adjust his prescription to say 1 tab daily

## 2022-05-05 ENCOUNTER — OFFICE VISIT (OUTPATIENT)
Dept: ENDOCRINOLOGY | Age: 37
End: 2022-05-05
Payer: COMMERCIAL

## 2022-05-05 VITALS
HEIGHT: 68 IN | OXYGEN SATURATION: 99 % | SYSTOLIC BLOOD PRESSURE: 127 MMHG | WEIGHT: 195 LBS | BODY MASS INDEX: 29.55 KG/M2 | DIASTOLIC BLOOD PRESSURE: 89 MMHG | HEART RATE: 72 BPM

## 2022-05-05 DIAGNOSIS — E05.00 GRAVES DISEASE: ICD-10-CM

## 2022-05-05 DIAGNOSIS — E05.00 GRAVES DISEASE: Primary | ICD-10-CM

## 2022-05-05 LAB
T3 FREE: 3.2 PG/ML (ref 2–4.4)
T4 FREE: 1.06 NG/DL (ref 0.93–1.7)
TSH SERPL DL<=0.05 MIU/L-ACNC: 2.16 UIU/ML (ref 0.27–4.2)

## 2022-05-05 PROCEDURE — 4004F PT TOBACCO SCREEN RCVD TLK: CPT | Performed by: CLINICAL NURSE SPECIALIST

## 2022-05-05 PROCEDURE — G8427 DOCREV CUR MEDS BY ELIG CLIN: HCPCS | Performed by: CLINICAL NURSE SPECIALIST

## 2022-05-05 PROCEDURE — 99214 OFFICE O/P EST MOD 30 MIN: CPT | Performed by: CLINICAL NURSE SPECIALIST

## 2022-05-05 PROCEDURE — G8419 CALC BMI OUT NRM PARAM NOF/U: HCPCS | Performed by: CLINICAL NURSE SPECIALIST

## 2022-05-05 RX ORDER — CHOLECALCIFEROL (VITAMIN D3) 50 MCG
2000 TABLET ORAL DAILY
Qty: 90 TABLET | Refills: 1 | Status: SHIPPED | OUTPATIENT
Start: 2022-05-05

## 2022-05-05 NOTE — PROGRESS NOTES
[address] [place] [address] Department of Endocrinology Diabetes and Metabolism   [address] [facility] [address]   Phone: [phone]  Fax: [phone]    Date of Service: 5/5/2022    Primary Care Physician: Marjorie Gardner MD  Referring physician: No ref. provider found  Provider: Dequan TORRES    Reason for the visit:  Graves Hyperthyroidism, vitD deficiency      History of Present Illness: The history is provided by the patient. No  was used. Accuracy of the patient data is excellent. Alicia Polanco is a 39 y.o. male seen in the clinic today for follow up visit on graves hyperthyroidism      The patient was in his usual state health until July 2018 when started c/o intermittent palpitations, swelling in the area of the thyroid gland, unintentional weight loss, worsening nervousness, anxiety, irritability, tremor, sweating, heat intolerance, changes in bowel habits, muscle weakness and difficulty sleeping.   Labs 9/26/2018:  TSH < 0.005, free T 7.89         10/2/2018 Thyroid US   Thyroid gland diffusely heterogenous  Rt lobe measures 6.5 x 2.8 x 2.7 cm --> increase vascularity no nodules  Lt lobe measures 5.9 c 2.4 x 3.3 cm --> increase vascularity, no nodules      10/23/2018 Thyroid Uptake and scan   Homogenous uptake, 24 hrs uptake was 79.9% consistent with graves disease     Pt was on Methimazole form 9/2018 to 8/2019   Methimazole was stopped in 8/2019 because very low thyroid hormones      Methimazole was restarted as thyroid function showed thyroid toxicity  Patient currently on methimazole 2.5 mg daily    He was out of medication last month and did not take for 3 weeks now back on medication recently         Most recent thyroid levels   Lab Results   Component Value Date    TSH 1.390 01/05/2022    B8VIAWM 96.33 07/23/2020    Y3NYPZM 2.6 (L) 04/14/2021    FT3 3.2 09/01/2021    T4FREE 1.06 01/05/2022       PAST MEDICAL HISTORY   Past Medical History: Diagnosis Date    Hyperthyroidism     Vitamin D deficiency        PAST SURGICAL HISTORY   No past surgical history on file. SOCIAL HISTORY   Tobacco:   reports that he has been smoking cigarettes. He has been smoking about 0.50 packs per day. He has never used smokeless tobacco.  Alcohol:   reports current alcohol use. Drugs:   reports current drug use. FAMILY HISTORY   Family History   Problem Relation Age of Onset    Hyperthyroidism Maternal Aunt        ALLERGIES AND DRUG REACTIONS   No Known Allergies    CURRENT MEDICATIONS   Current Outpatient Medications   Medication Sig Dispense Refill    vitamin D (CHOLECALCIFEROL) 50 MCG (2000 UT) TABS tablet Take 1 tablet by mouth daily 90 tablet 1    methIMAzole (TAPAZOLE) 5 MG tablet Take 0.5 tablet daily 45 tablet 3     No current facility-administered medications for this visit. Review of Systems  Constitutional: No fever, no chills, no diaphoresis, no generalized weakness. HEENT: No blurred vision, No sore throat, no ear pain, no hair loss  Neck: denied any neck swelling, difficulty swallowing,   Cardio-pulmonary: No CP, SOB or palpitation, No orthopnea or PND. No cough or wheezing. GI: No N/V/D, no constipation, No abdominal pain, no melena or hematochezia   : Denied any dysuria, hematuria, flank pain, discharge, or incontinence. Skin: denied any rash, ulcer, Hirsute, or hyperpigmentation. MSK: denied any joint deformity, joint pain/swelling, muscle pain, or back pain.   Neuro: no numbness, no tingling, no weakness, _    OBJECTIVE    /89   Pulse 72   Ht 5' 8\" (1.727 m)   Wt 195 lb (88.5 kg)   SpO2 99%   BMI 29.65 kg/m²   BP Readings from Last 4 Encounters:   05/05/22 127/89   01/05/22 (!) 140/93   09/01/21 (!) 137/93   11/24/20 132/72     Wt Readings from Last 6 Encounters:   05/05/22 195 lb (88.5 kg)   01/05/22 187 lb (84.8 kg)   09/01/21 187 lb (84.8 kg)   11/24/20 193 lb (87.5 kg)   08/21/19 187 lb 12.8 oz (85.2 kg)   03/11/19 173 lb 6.4 oz (78.7 kg)       Physical examination:  General: awake alert, oriented x3, no abnormal position or movements. HEENT: normocephalic non-traumatic, + bilateral  exophthalmos   Neck: supple, no LN enlargement, no thyromegaly, no thyroid tenderness, no JVD. Pulm: Clear equal air entry no added sounds, no wheezing or rhonchi    CVS: S1 + S2, no murmur, no heave. Dorsalis pedis pulse palpable   Abd: soft lax, no tenderness, no organomegaly, audible bowel sounds. Skin: warm, no lesions, no rash.   Musculoskeletal: No back tenderness, no kyphosis/scoliosis    Neuro: CN intact, , muscle power normal  Psych: normal mood, and affect      Review of Laboratory Data:  I personally reviewed the following lab:  Lab Results   Component Value Date/Time    WBC 4.7 01/14/2019 01:25 PM    RBC 4.85 01/14/2019 01:25 PM    HGB 13.0 01/14/2019 01:25 PM    HCT 39.9 01/14/2019 01:25 PM    MCV 82.3 01/14/2019 01:25 PM    MCH 26.8 01/14/2019 01:25 PM    MCHC 32.6 01/14/2019 01:25 PM    RDW 13.9 01/14/2019 01:25 PM     01/14/2019 01:25 PM    MPV 10.3 01/14/2019 01:25 PM      Lab Results   Component Value Date/Time     01/05/2022 02:28 PM    K 4.6 01/05/2022 02:28 PM    CO2 22 01/05/2022 02:28 PM    BUN 10 01/05/2022 02:28 PM    CREATININE 1.4 (H) 01/05/2022 02:28 PM    CALCIUM 9.4 01/05/2022 02:28 PM    LABGLOM >60 01/05/2022 02:28 PM    GFRAA >60 01/05/2022 02:28 PM      Lab Results   Component Value Date/Time    TSH 1.390 01/05/2022 02:28 PM    T4FREE 1.06 01/05/2022 02:28 PM    E3ZTBMV 2.6 (L) 04/14/2021 01:20 PM    FT3 3.2 09/01/2021 12:00 PM    I2TLJHG 96.33 07/23/2020 04:17 PM    TSI 9.37 (H) 05/08/2020 11:25 AM    TPOABS 135.7 (H) 05/08/2020 11:25 AM    THGAB <0.9 05/08/2020 11:25 AM     Lab Results   Component Value Date    GLUCOSE 95 01/05/2022    GLUCOSE 108 12/30/2011     No results found for: LABA1C  No results found for: TRIG, HDL, LDLCALC, CHOL  Lab Results   Component Value Date    VITD25 20 01/05/2022 NZEQ45 13 09/01/2021       ASSESSMENT & RECOMMENDATIONS   Mikhail García, a 39 y.o.-old male seen in for the following issues       Assessment:      Diagnosis Orders   1. Graves disease  T4, Free    TSH    T3, Free    Thyroid Stimulating Immunoglobulin       Plan:     1. Graves disease   · Patient denies symptoms of hyperthyroidism at this point  · Patient was out of methimazole for approximately 3 weeks  · Recently restarted 2.5 mg daily  · Advised patient to call us if he runs out of medication and we will send him a prescription and not wait until his visit  · Will reassess TSH, free T4, and free T3  · Check TSI   · Continue methimazole 2.5 mg daily for now  · Further recommendations will be made after results are obtained  · Counseled on side effects of methimazole use. · Advised patient to stop methimazole and let us know if any high fever, dark-colored urine, or severe abdominal pain occurs  · I do not recommend radioactive iodine at this point due to Graves eye disease  · Counseled on symptoms hyperthyroidism and hypothyroidism  · Patient verbalized understanding. 2. Vitamin D deficiency   · Will start vitamin D 2000 IU daily  · Counseled on the importance of vitamin D and bone health  · Counseled on the effects of hyperthyroidism including osteoporosis   3. H/O medication noncompliance   · Encourage compliance with medication use         Return in about 3 months (around 8/5/2022). The above issues were reviewed with the patient who understood and agreed with the plan. Thank you for allowing us to participate in the care of this patient. Please do not hesitate to contact us with any additional questions. Pablito HAILE [facility] Diabetes Care and Endocrinology   [address] [facility] [address]   Phone: [phone]  Fax: [phone]  --------------------------------------------  An electronic signature was used to authenticate this note.  Pablito TORRES on 5/5/2022 at 1:50 PM

## 2022-05-09 ENCOUNTER — TELEPHONE (OUTPATIENT)
Dept: ENDOCRINOLOGY | Age: 37
End: 2022-05-09

## 2022-05-09 NOTE — TELEPHONE ENCOUNTER
----- Message from HILARIA Still - CNS sent at 5/9/2022  8:51 AM EDT -----  Please call patient and inform him thyroid function is normal.  Continue same  TSI antibody is still pending

## 2022-05-10 LAB — THYROID STIMULATING IMMUNOGLOBULIN: 3.03 IU/L

## 2022-08-23 DIAGNOSIS — E05.00 GRAVES DISEASE: ICD-10-CM

## 2022-08-23 RX ORDER — METHIMAZOLE 5 MG/1
TABLET ORAL
Qty: 45 TABLET | Refills: 3 | Status: SHIPPED
Start: 2022-08-23 | End: 2022-11-03 | Stop reason: SDUPTHER

## 2022-11-03 ENCOUNTER — OFFICE VISIT (OUTPATIENT)
Dept: ENDOCRINOLOGY | Age: 37
End: 2022-11-03
Payer: COMMERCIAL

## 2022-11-03 VITALS
HEART RATE: 68 BPM | OXYGEN SATURATION: 100 % | BODY MASS INDEX: 28.79 KG/M2 | WEIGHT: 190 LBS | DIASTOLIC BLOOD PRESSURE: 89 MMHG | HEIGHT: 68 IN | SYSTOLIC BLOOD PRESSURE: 131 MMHG

## 2022-11-03 DIAGNOSIS — E05.00 GRAVES DISEASE: ICD-10-CM

## 2022-11-03 DIAGNOSIS — E55.9 VITAMIN D DEFICIENCY: Primary | ICD-10-CM

## 2022-11-03 LAB
T4 FREE: 1.2 NG/DL (ref 0.93–1.7)
TSH SERPL DL<=0.05 MIU/L-ACNC: 2.18 UIU/ML (ref 0.27–4.2)

## 2022-11-03 PROCEDURE — G8427 DOCREV CUR MEDS BY ELIG CLIN: HCPCS | Performed by: INTERNAL MEDICINE

## 2022-11-03 PROCEDURE — 4004F PT TOBACCO SCREEN RCVD TLK: CPT | Performed by: INTERNAL MEDICINE

## 2022-11-03 PROCEDURE — G8419 CALC BMI OUT NRM PARAM NOF/U: HCPCS | Performed by: INTERNAL MEDICINE

## 2022-11-03 PROCEDURE — G8484 FLU IMMUNIZE NO ADMIN: HCPCS | Performed by: INTERNAL MEDICINE

## 2022-11-03 PROCEDURE — 99214 OFFICE O/P EST MOD 30 MIN: CPT | Performed by: INTERNAL MEDICINE

## 2022-11-03 RX ORDER — METHIMAZOLE 5 MG/1
TABLET ORAL
Qty: 45 TABLET | Refills: 3 | Status: SHIPPED | OUTPATIENT
Start: 2022-11-03

## 2022-11-03 NOTE — PROGRESS NOTES
[address] [place] [address] Department of Endocrinology Diabetes and Metabolism   [address] [facility] [address]   Phone: [phone]  Fax: [phone]    Date of Service: 11/3/2022  Primary Care Physician: Jaime Nicole MD  Provider: Wade Charles MD     Reason for the visit:  Graves Hyperthyroidism, vitD deficiency      History of Present Illness: The history is provided by the patient. No  was used. Accuracy of the patient data is excellent. Pam Abrams is a 40 y.o.  y.o. male seen in the clinic today for follow up visit on graves hyperthyroidism      The patient was in his usual state health until July 2018 when started c/o intermittent palpitations, swelling in the area of the thyroid gland, unintentional weight loss, worsening nervousness, anxiety, irritability, tremor, sweating, heat intolerance, changes in bowel habits, muscle weakness and difficulty sleeping.   Labs 9/26/2018:  TSH < 0.005, free T 7.89         10/2/2018 Thyroid US   Thyroid gland diffusely heterogenous  Rt lobe measures 6.5 x 2.8 x 2.7 cm --> increase vascularity no nodules  Lt lobe measures 5.9 c 2.4 x 3.3 cm --> increase vascularity, no nodules      10/23/2018 Thyroid Uptake and scan   Homogenous uptake, 24 hrs uptake was 79.9% consistent with graves disease     Pt was on Methimazole form 9/2018 to 8/2019   Methimazole was stopped in 8/2019 because very low thyroid hormones      Methimazole was restarted as thyroid function showed thyroid toxicity    Patient currently on methimazole 2.5 mg daily    He was out of medication last month and did not take for 3 weeks now back on medication recently     Most recent thyroid levels   Lab Results   Component Value Date    TSH 2.160 05/05/2022    FT3 3.2 05/05/2022    T4FREE 1.06 05/05/2022       PAST MEDICAL HISTORY   Past Medical History:   Diagnosis Date    Hyperthyroidism     Vitamin D deficiency        PAST SURGICAL HISTORY   No past surgical history on file. SOCIAL HISTORY   Tobacco:   reports that he has been smoking cigarettes. He has been smoking an average of .5 packs per day. He has never used smokeless tobacco.  Alcohol:   reports current alcohol use. Drugs:   reports current drug use. FAMILY HISTORY   Family History   Problem Relation Age of Onset    Hyperthyroidism Maternal Aunt        ALLERGIES AND DRUG REACTIONS   No Known Allergies    CURRENT MEDICATIONS   Current Outpatient Medications   Medication Sig Dispense Refill    methIMAzole (TAPAZOLE) 5 MG tablet Take 0.5 tablet daily 45 tablet 3    vitamin D (CHOLECALCIFEROL) 50 MCG (2000 UT) TABS tablet Take 1 tablet by mouth daily 90 tablet 1     No current facility-administered medications for this visit. Review of Systems  Constitutional: No fever, no chills, no diaphoresis, no generalized weakness. HEENT: No blurred vision, No sore throat, no ear pain, no hair loss  Neck: denied any neck swelling, difficulty swallowing,   Cardio-pulmonary: No CP, SOB or palpitation, No orthopnea or PND. No cough or wheezing. GI: No N/V/D, no constipation, No abdominal pain, no melena or hematochezia   : Denied any dysuria, hematuria, flank pain, discharge, or incontinence. Skin: denied any rash, ulcer, Hirsute, or hyperpigmentation. MSK: denied any joint deformity, joint pain/swelling, muscle pain, or back pain.   Neuro: no numbness, no tingling, no weakness, _    OBJECTIVE    /89   Pulse 68   Ht 5' 8\" (1.727 m)   Wt 190 lb (86.2 kg)   SpO2 100%   BMI 28.89 kg/m²   BP Readings from Last 4 Encounters:   11/03/22 131/89   05/05/22 127/89   01/05/22 (!) 140/93   09/01/21 (!) 137/93     Wt Readings from Last 6 Encounters:   11/03/22 190 lb (86.2 kg)   05/05/22 195 lb (88.5 kg)   01/05/22 187 lb (84.8 kg)   09/01/21 187 lb (84.8 kg)   11/24/20 193 lb (87.5 kg)   08/21/19 187 lb 12.8 oz (85.2 kg)       Physical examination:  General: awake alert, oriented x3, no abnormal position or movements. HEENT: normocephalic non-traumatic, + bilateral  exophthalmos   Neck: supple, no LN enlargement, no thyromegaly, no thyroid tenderness, no JVD. Pulm: Clear equal air entry no added sounds, no wheezing or rhonchi    CVS: S1 + S2, no murmur, no heave. Dorsalis pedis pulse palpable   Abd: soft lax, no tenderness, no organomegaly, audible bowel sounds. Skin: warm, no lesions, no rash.   Musculoskeletal: No back tenderness, no kyphosis/scoliosis    Neuro: CN intact, , muscle power normal  Psych: normal mood, and affect      Review of Laboratory Data:  I personally reviewed the following lab:  Lab Results   Component Value Date/Time    WBC 4.7 01/14/2019 01:25 PM    RBC 4.85 01/14/2019 01:25 PM    HGB 13.0 01/14/2019 01:25 PM    HCT 39.9 01/14/2019 01:25 PM    MCV 82.3 01/14/2019 01:25 PM    MCH 26.8 01/14/2019 01:25 PM    MCHC 32.6 01/14/2019 01:25 PM    RDW 13.9 01/14/2019 01:25 PM     01/14/2019 01:25 PM    MPV 10.3 01/14/2019 01:25 PM      Lab Results   Component Value Date/Time     01/05/2022 02:28 PM    K 4.6 01/05/2022 02:28 PM    CO2 22 01/05/2022 02:28 PM    BUN 10 01/05/2022 02:28 PM    CREATININE 1.4 (H) 01/05/2022 02:28 PM    CALCIUM 9.4 01/05/2022 02:28 PM    LABGLOM >60 01/05/2022 02:28 PM    GFRAA >60 01/05/2022 02:28 PM      Lab Results   Component Value Date/Time    TSH 2.160 05/05/2022 01:52 PM    T4FREE 1.06 05/05/2022 01:52 PM    E5HAEZN 2.6 (L) 04/14/2021 01:20 PM    FT3 3.2 05/05/2022 01:52 PM    FT3 3.2 09/01/2021 12:00 PM    W4MOSED 96.33 07/23/2020 04:17 PM    TSI 3.03 (H) 05/05/2022 01:51 PM    TPOABS 135.7 (H) 05/08/2020 11:25 AM    THGAB <0.9 05/08/2020 11:25 AM     Lab Results   Component Value Date/Time    GLUCOSE 95 01/05/2022 02:28 PM    GLUCOSE 108 12/30/2011 11:00 PM     No results found for: LABA1C  No results found for: TRIG, HDL, LDLCALC, CHOL  Lab Results   Component Value Date/Time    VITD25 20 01/05/2022 02:28 PM    VITD25 13 09/01/2021 12:00 PM       ASSESSMENT & RECOMMENDATIONS   Mikhail Valdovinos, a 40 y.o.-old male seen in for the following issues       Assessment:      Diagnosis Orders   1. Vitamin D deficiency        2. Graves disease  TSH    T4, Free    methIMAzole (TAPAZOLE) 5 MG tablet          Plan:     1. Graves disease   Feels good   Current on  Methimazole  2.5 mg daily  Check level today   Reviewed potential side effects of Methimazole, including agranulocytosis and liver dysfunction (cholestasis). 2. Vitamin D deficiency   Encouraged vitD supplement   Counseled on the importance of vitamin D and bone health  Counseled on the effects of hyperthyroidism including osteoporosis   3. H/O medication noncompliance   Encourage compliance with medication use     Return in about 6 months (around 5/3/2023) for Hyperthyroidism, VitD deficiency. The above issues were reviewed with the patient who understood and agreed with the plan. Thank you for allowing us to participate in the care of this patient. Please do not hesitate to contact us with any additional questions. Valerio TORRES    [facility] Diabetes Care and Endocrinology   [address] [place] [address]   Phone: [phone]  Fax: [phone]  --------------------------------------------  An electronic signature was used to authenticate this note.  Justina Palomino MD  on 11/3/2022 at 12:57 PM

## 2022-11-04 ENCOUNTER — TELEPHONE (OUTPATIENT)
Dept: ENDOCRINOLOGY | Age: 37
End: 2022-11-04

## 2022-11-04 NOTE — TELEPHONE ENCOUNTER
Endocrine staff,   Please notify pt that I have reviewed your recent results. Great!, thyroid hormones results are within an acceptable range of normal. There is no need for a change in your therapy at this time.

## 2023-03-06 DIAGNOSIS — E05.00 GRAVES DISEASE: ICD-10-CM

## 2023-03-08 RX ORDER — METHIMAZOLE 5 MG/1
TABLET ORAL
Qty: 45 TABLET | Refills: 3 | Status: SHIPPED | OUTPATIENT
Start: 2023-03-08

## 2023-06-06 ENCOUNTER — OFFICE VISIT (OUTPATIENT)
Dept: ENDOCRINOLOGY | Age: 38
End: 2023-06-06
Payer: COMMERCIAL

## 2023-06-06 VITALS
SYSTOLIC BLOOD PRESSURE: 132 MMHG | OXYGEN SATURATION: 99 % | HEIGHT: 67 IN | WEIGHT: 190 LBS | HEART RATE: 67 BPM | RESPIRATION RATE: 18 BRPM | BODY MASS INDEX: 29.82 KG/M2 | DIASTOLIC BLOOD PRESSURE: 84 MMHG

## 2023-06-06 DIAGNOSIS — E05.00 GRAVES DISEASE: Primary | ICD-10-CM

## 2023-06-06 DIAGNOSIS — E04.2 MULTINODULAR GOITER: ICD-10-CM

## 2023-06-06 DIAGNOSIS — E55.9 VITAMIN D DEFICIENCY: ICD-10-CM

## 2023-06-06 PROCEDURE — G8427 DOCREV CUR MEDS BY ELIG CLIN: HCPCS | Performed by: INTERNAL MEDICINE

## 2023-06-06 PROCEDURE — 4004F PT TOBACCO SCREEN RCVD TLK: CPT | Performed by: INTERNAL MEDICINE

## 2023-06-06 PROCEDURE — 99214 OFFICE O/P EST MOD 30 MIN: CPT | Performed by: INTERNAL MEDICINE

## 2023-06-06 PROCEDURE — G8419 CALC BMI OUT NRM PARAM NOF/U: HCPCS | Performed by: INTERNAL MEDICINE

## 2023-06-06 RX ORDER — METHIMAZOLE 5 MG/1
TABLET ORAL
Qty: 45 TABLET | Refills: 3 | Status: SHIPPED | OUTPATIENT
Start: 2023-06-06

## 2023-06-06 NOTE — PROGRESS NOTES
Diagnosis Orders   1. Graves disease  TSH    T4, Free    methIMAzole (TAPAZOLE) 5 MG tablet      2. Multinodular goiter        3. Vitamin D deficiency            Plan:     1. Graves disease   Feels good   Current on  Methimazole  2.5 mg daily  TFT today   Reviewed potential side effects of Methimazole, including agranulocytosis and liver dysfunction (cholestasis). 2. Vitamin D deficiency   Encouraged vitD supplement   Counseled on the importance of vitamin D and bone health  Counseled on the effects of hyperthyroidism including osteoporosis   3. H/O medication noncompliance   Encourage compliance with medication use     Return in about 6 months (around 12/6/2023) for Hyperthyroidism. The above issues were reviewed with the patient who understood and agreed with the plan. Thank you for allowing us to participate in the care of this patient. Please do not hesitate to contact us with any additional questions. MD MIC Randhawa [facility] Diabetes Care and Endocrinology   [address] [place] [address]   Phone: [phone]  Fax: [phone]  --------------------------------------------  An electronic signature was used to authenticate this note.  Martin Ordonez MD  on 6/6/2023 at 9:36 AM

## 2023-06-18 ENCOUNTER — TELEPHONE (OUTPATIENT)
Dept: ENDOCRINOLOGY | Age: 38
End: 2023-06-18

## 2023-06-19 NOTE — TELEPHONE ENCOUNTER
Endocrine staff,   Please notify pt that I have reviewed your recent results. Great!, thyroid hormones results are within an acceptable range of normal. There is no need for a change in your therapy at this time. Bronchospasm Dysuria Elevated troponin

## 2023-09-28 ENCOUNTER — TELEPHONE (OUTPATIENT)
Dept: ENDOCRINOLOGY | Age: 38
End: 2023-09-28

## 2023-09-28 DIAGNOSIS — E05.00 GRAVES DISEASE: Primary | ICD-10-CM

## 2023-09-28 DIAGNOSIS — E05.00 GRAVES DISEASE: ICD-10-CM

## 2023-09-28 RX ORDER — METHIMAZOLE 5 MG/1
TABLET ORAL
Qty: 45 TABLET | Refills: 3 | Status: SHIPPED
Start: 2023-09-28 | End: 2023-09-29

## 2023-09-28 NOTE — TELEPHONE ENCOUNTER
Patient called he hasn't taken his medication for a a few month due to the fact he was in assisted they would not give him his medication.   Need a refill  and do you want him to have lab work

## 2023-09-28 NOTE — TELEPHONE ENCOUNTER
Patient called he hasn't taken his medication for a a few month due to the fact he was in [facility] they would not give him his medication.   Need a refill  and do you want him to have lab work    Please advise

## 2023-09-29 RX ORDER — METHIMAZOLE 5 MG/1
TABLET ORAL
Qty: 50 TABLET | Refills: 5 | Status: SHIPPED | OUTPATIENT
Start: 2023-09-29

## 2023-09-29 NOTE — TELEPHONE ENCOUNTER
We will restart methimazole at 5 mg twice daily for 5 days and then 5 mg daily after that.   We will recheck thyroid level in 6

## 2024-01-08 ENCOUNTER — OFFICE VISIT (OUTPATIENT)
Dept: ENDOCRINOLOGY | Age: 39
End: 2024-01-08

## 2024-01-08 VITALS
DIASTOLIC BLOOD PRESSURE: 85 MMHG | HEIGHT: 67 IN | HEART RATE: 66 BPM | RESPIRATION RATE: 18 BRPM | BODY MASS INDEX: 29.19 KG/M2 | OXYGEN SATURATION: 99 % | WEIGHT: 186 LBS | SYSTOLIC BLOOD PRESSURE: 129 MMHG

## 2024-01-08 DIAGNOSIS — E04.2 MULTINODULAR GOITER: ICD-10-CM

## 2024-01-08 DIAGNOSIS — E05.00 GRAVES DISEASE: Primary | ICD-10-CM

## 2024-01-08 DIAGNOSIS — E55.9 VITAMIN D DEFICIENCY: ICD-10-CM

## 2024-01-08 RX ORDER — METHIMAZOLE 5 MG/1
TABLET ORAL
Qty: 45 TABLET | Refills: 3 | Status: SHIPPED | OUTPATIENT
Start: 2024-01-08

## 2024-01-08 NOTE — PROGRESS NOTES
[facility] Department of Endocrinology Diabetes and Metabolism   [address] [place] [address]   Phone: [phone]  Fax: [phone]    Date of Service: 1/8/2024  Primary Care Physician: Moises Faust MD  Provider: Amos Santiago MD     Reason for the visit:  Graves Hyperthyroidism, vitD deficiency      History of Present Illness:  The history is provided by the patient. No  was used. Accuracy of the patient data is excellent.    Darren M Gilford is a 38 y.o.  y.o. male seen in the clinic today for follow up visit on graves hyperthyroidism      The patient was in his usual state health until July 2018 when started c/o intermittent palpitations, swelling in the area of the thyroid gland, unintentional weight loss, worsening nervousness, anxiety, irritability, tremor, sweating, heat intolerance, changes in bowel habits, muscle weakness and difficulty sleeping.  Labs 9/26/2018:  TSH < 0.005, free T 7.89    10/2/2018 Thyroid US   Thyroid gland diffusely heterogenous  Rt lobe measures 6.5 x 2.8 x 2.7 cm --> increase vascularity no nodules  Lt lobe measures 5.9 c 2.4 x 3.3 cm --> increase vascularity, no nodules      10/23/2018 Thyroid Uptake and scan   Homogenous uptake, 24 hrs uptake was 79.9% consistent with graves disease     Pt was on Methimazole form 9/2018 to 8/2019   Methimazole was stopped in 8/2019 because very low thyroid hormones      Methimazole was restarted as thyroid function showed thyroid toxicity    Patient currently on methimazole 2.5 mg daily  Lab Results   Component Value Date/Time    TSH 0.934 06/15/2023 02:28 PM    T4FREE 1.11 06/15/2023 02:28 PM   PAST MEDICAL HISTORY   Past Medical History:   Diagnosis Date    Hyperthyroidism     Vitamin D deficiency        PAST SURGICAL HISTORY   No past surgical history on file.    SOCIAL HISTORY   Tobacco:   reports that he has been smoking cigarettes. He has never used smokeless

## 2024-01-17 ENCOUNTER — HOSPITAL ENCOUNTER (OUTPATIENT)
Age: 39
Discharge: HOME | End: 2024-01-17
Payer: MEDICAID

## 2024-01-17 DIAGNOSIS — E05.00 GRAVES DISEASE: ICD-10-CM

## 2024-01-17 LAB
T4 FREE SERPL-MCNC: 1.4 NG/DL (ref 0.9–1.7)
T4 SERPL-MCNC: 6.7 UG/DL (ref 4.5–11.7)
TSH SERPL DL<=0.05 MIU/L-ACNC: 0.02 UIU/ML (ref 0.27–4.2)

## 2024-01-17 PROCEDURE — 36415 COLL VENOUS BLD VENIPUNCTURE: CPT

## 2024-01-17 PROCEDURE — 84443 ASSAY THYROID STIM HORMONE: CPT

## 2024-01-17 PROCEDURE — 84439 ASSAY OF FREE THYROXINE: CPT

## 2024-01-22 ENCOUNTER — TELEPHONE (OUTPATIENT)
Dept: ENDOCRINOLOGY | Age: 39
End: 2024-01-22

## 2024-01-22 DIAGNOSIS — E05.00 GRAVES DISEASE: Primary | ICD-10-CM

## 2024-01-22 RX ORDER — METHIMAZOLE 5 MG/1
TABLET ORAL
Qty: 90 TABLET | Refills: 3 | Status: SHIPPED | OUTPATIENT
Start: 2024-01-22

## 2024-01-22 NOTE — TELEPHONE ENCOUNTER
Notify patient  Thyroid hormones are above normal range.  Will change methimazole from 2.5 mg daily to 5 mg daily.  Recheck level in 6 to 8 weeks

## 2024-04-10 ENCOUNTER — HOSPITAL ENCOUNTER (OUTPATIENT)
Age: 39
Discharge: HOME | End: 2024-04-10
Payer: MEDICAID

## 2024-04-10 DIAGNOSIS — E05.00 GRAVES DISEASE: ICD-10-CM

## 2024-04-10 LAB
T4 FREE SERPL-MCNC: 1.5 NG/DL (ref 0.9–1.7)
T4 SERPL-MCNC: 6.6 UG/DL (ref 4.5–11.7)
TSH SERPL DL<=0.05 MIU/L-ACNC: 0.01 UIU/ML (ref 0.27–4.2)

## 2024-04-10 PROCEDURE — 84443 ASSAY THYROID STIM HORMONE: CPT

## 2024-04-10 PROCEDURE — 84439 ASSAY OF FREE THYROXINE: CPT

## 2024-04-10 PROCEDURE — 36415 COLL VENOUS BLD VENIPUNCTURE: CPT

## 2024-04-11 ENCOUNTER — TELEPHONE (OUTPATIENT)
Dept: ENDOCRINOLOGY | Age: 39
End: 2024-04-11

## 2024-04-11 NOTE — TELEPHONE ENCOUNTER
Notify patient  Thyroid hormones are slightly above goal.  His total T4 is in the low normal level, because of this please check if he was missing any doses of methimazole over the past 6 to 8 weeks.  If he was missing any doses please let us know before adjust the dose.  If patient was not missing any doses at all, then I recommend slight adjust methimazole from 5 mg 1 tablet daily to 1-1/2 tablet daily for total of 7.5 mg daily.

## 2024-04-15 NOTE — TELEPHONE ENCOUNTER
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 Please advise.

## 2024-04-19 DIAGNOSIS — E05.00 GRAVES DISEASE: ICD-10-CM

## 2024-04-19 RX ORDER — METHIMAZOLE 5 MG/1
TABLET ORAL
Qty: 90 TABLET | Refills: 3 | Status: SHIPPED | OUTPATIENT
Start: 2024-04-19

## 2024-04-19 NOTE — TELEPHONE ENCOUNTER
Amos Santiago MD   4/16/24  7:31 AM  Note     Please refill his methimazole at the previous dose 5 mg daily.  Please update his med list      Refill sent

## 2024-08-01 ENCOUNTER — TELEPHONE (OUTPATIENT)
Dept: ENDOCRINOLOGY | Age: 39
End: 2024-08-01

## 2024-08-01 NOTE — TELEPHONE ENCOUNTER
Refill mathimazole, patient requested it sent to [facility] in [facility] but I can't pull any [facility] up. I left a message for the patient to call with another pharmacy

## 2024-08-05 RX ORDER — METHIMAZOLE 5 MG/1
5 TABLET ORAL 3 TIMES DAILY
Qty: 30 TABLET | Refills: 1 | Status: SHIPPED | OUTPATIENT
Start: 2024-08-05 | End: 2024-09-04

## 2024-08-20 RX ORDER — METHIMAZOLE 5 MG/1
5 TABLET ORAL DAILY
Qty: 30 TABLET | Refills: 0 | Status: SHIPPED | OUTPATIENT
Start: 2024-08-20 | End: 2024-09-19

## 2024-10-01 DIAGNOSIS — E05.00 GRAVES DISEASE: ICD-10-CM

## 2024-10-01 RX ORDER — METHIMAZOLE 5 MG/1
TABLET ORAL
Qty: 45 TABLET | Refills: 3 | Status: SHIPPED | OUTPATIENT
Start: 2024-10-01

## 2024-11-11 DIAGNOSIS — E05.00 GRAVES DISEASE: ICD-10-CM

## 2024-11-11 RX ORDER — METHIMAZOLE 5 MG/1
TABLET ORAL
Qty: 45 TABLET | Refills: 3 | Status: SHIPPED | OUTPATIENT
Start: 2024-11-11

## 2025-02-06 ENCOUNTER — OFFICE VISIT (OUTPATIENT)
Dept: ENDOCRINOLOGY | Age: 40
End: 2025-02-06
Payer: MEDICARE

## 2025-02-06 VITALS
DIASTOLIC BLOOD PRESSURE: 78 MMHG | HEIGHT: 67 IN | WEIGHT: 172 LBS | HEART RATE: 105 BPM | OXYGEN SATURATION: 99 % | SYSTOLIC BLOOD PRESSURE: 133 MMHG | RESPIRATION RATE: 18 BRPM | BODY MASS INDEX: 27 KG/M2 | TEMPERATURE: 98.4 F

## 2025-02-06 DIAGNOSIS — E55.9 VITAMIN D DEFICIENCY: ICD-10-CM

## 2025-02-06 DIAGNOSIS — E04.2 MULTINODULAR GOITER: ICD-10-CM

## 2025-02-06 DIAGNOSIS — E05.00 GRAVES DISEASE: Primary | ICD-10-CM

## 2025-02-06 PROCEDURE — G2211 COMPLEX E/M VISIT ADD ON: HCPCS | Performed by: INTERNAL MEDICINE

## 2025-02-06 PROCEDURE — 99214 OFFICE O/P EST MOD 30 MIN: CPT | Performed by: INTERNAL MEDICINE

## 2025-02-06 PROCEDURE — G8427 DOCREV CUR MEDS BY ELIG CLIN: HCPCS | Performed by: INTERNAL MEDICINE

## 2025-02-06 PROCEDURE — G8419 CALC BMI OUT NRM PARAM NOF/U: HCPCS | Performed by: INTERNAL MEDICINE

## 2025-02-06 PROCEDURE — 4004F PT TOBACCO SCREEN RCVD TLK: CPT | Performed by: INTERNAL MEDICINE

## 2025-02-06 NOTE — PROGRESS NOTES
[facility] Department of Endocrinology Diabetes and Metabolism   [address] [place] [address]   Phone: [phone]  Fax: [phone]    Date of Service: 2/6/2025  Primary Care Physician: Moises Faust MD  Provider: Amos Santiago MD     Reason for the visit:  Graves Hyperthyroidism, vitD deficiency      History of Present Illness:  The history is provided by the patient. No  was used. Accuracy of the patient data is excellent.    Darren M Gilford is a 39 y.o.  y.o. male seen in the clinic today for follow up visit on graves hyperthyroidism      The patient was in his usual state health until July 2018 when started c/o intermittent palpitations, swelling in the area of the thyroid gland, unintentional weight loss, worsening nervousness, anxiety, irritability, tremor, sweating, heat intolerance, changes in bowel habits, muscle weakness and difficulty sleeping.  Labs 9/26/2018:  TSH < 0.005, free T 7.89    10/2/2018 Thyroid US   Thyroid gland diffusely heterogenous  Rt lobe measures 6.5 x 2.8 x 2.7 cm --> increase vascularity no nodules  Lt lobe measures 5.9 c 2.4 x 3.3 cm --> increase vascularity, no nodules      10/23/2018 Thyroid Uptake and scan   Homogenous uptake, 24 hrs uptake was 79.9% consistent with graves disease     Pt was on Methimazole form 9/2018 to 8/2019   Methimazole was stopped in 8/2019 because very low thyroid hormones      Methimazole was restarted as thyroid function showed thyroid toxicity    Patient currently on methimazole 7.5 mg daily    The patient admits missing methimazole frequently    He misses at least 2-3 doses a week    The patient is due for blood work now    PAST MEDICAL HISTORY   Past Medical History:   Diagnosis Date    Hyperthyroidism     Vitamin D deficiency        PAST SURGICAL HISTORY   No past surgical history on file.    SOCIAL HISTORY   Tobacco:   reports that he has been smoking cigarettes. He

## 2025-02-10 ENCOUNTER — HOSPITAL ENCOUNTER (OUTPATIENT)
Age: 40
Discharge: HOME | End: 2025-02-10
Payer: MEDICARE

## 2025-02-10 ENCOUNTER — TELEPHONE (OUTPATIENT)
Dept: ENDOCRINOLOGY | Age: 40
End: 2025-02-10

## 2025-02-10 DIAGNOSIS — E05.00 GRAVES DISEASE: Primary | ICD-10-CM

## 2025-02-10 DIAGNOSIS — E05.00 GRAVES DISEASE: ICD-10-CM

## 2025-02-10 LAB
T4 FREE SERPL-MCNC: 3.2 NG/DL (ref 0.9–1.7)
TSH SERPL DL<=0.05 MIU/L-ACNC: <0.01 UIU/ML (ref 0.27–4.2)

## 2025-02-10 PROCEDURE — 36415 COLL VENOUS BLD VENIPUNCTURE: CPT

## 2025-02-10 PROCEDURE — 84443 ASSAY THYROID STIM HORMONE: CPT

## 2025-02-10 PROCEDURE — 84439 ASSAY OF FREE THYROXINE: CPT

## 2025-02-11 NOTE — TELEPHONE ENCOUNTER
Endocrine staff,   Please notify pt that I have reviewed your recent results.     Thyroid hormones are high, please confirm he is currently on methimazole 5 mg 1-1/2 tablet daily.  If so, change methimazole to 1 tablet twice daily and recheck thyroid level TSH and free T4 again in 6 weeks.  Please update his med list to match the current recommendation.

## 2025-02-12 DIAGNOSIS — E05.00 GRAVES DISEASE: Primary | ICD-10-CM

## 2025-02-18 ENCOUNTER — HOSPITAL ENCOUNTER (OUTPATIENT)
Dept: NUCLEAR MEDICINE | Age: 40
Discharge: HOME | End: 2025-02-18
Attending: INTERNAL MEDICINE
Payer: MEDICARE

## 2025-02-18 DIAGNOSIS — E05.00 GRAVES DISEASE: ICD-10-CM

## 2025-02-18 PROCEDURE — A9517 I131 IODIDE CAP, RX: HCPCS | Performed by: RADIOLOGY

## 2025-02-18 PROCEDURE — 78014 THYROID IMAGING W/BLOOD FLOW: CPT

## 2025-02-18 PROCEDURE — 3430000000 HC RX DIAGNOSTIC RADIOPHARMACEUTICAL: Performed by: RADIOLOGY

## 2025-02-18 RX ADMIN — Medication 15 MICRO CURIE: at 13:21

## 2025-02-19 ENCOUNTER — HOSPITAL ENCOUNTER (OUTPATIENT)
Dept: NUCLEAR MEDICINE | Age: 40
Discharge: HOME | End: 2025-02-19
Attending: INTERNAL MEDICINE
Payer: MEDICARE

## 2025-02-19 PROCEDURE — 3430000000 HC RX DIAGNOSTIC RADIOPHARMACEUTICAL: Performed by: RADIOLOGY

## 2025-02-19 PROCEDURE — A9512 TC99M PERTECHNETATE: HCPCS | Performed by: RADIOLOGY

## 2025-02-19 RX ADMIN — Medication 10 MILLICURIE: at 12:17

## 2025-02-24 ENCOUNTER — HOSPITAL ENCOUNTER (OUTPATIENT)
Dept: NUCLEAR MEDICINE | Age: 40
Discharge: HOME | End: 2025-02-26
Payer: MEDICARE

## 2025-02-24 DIAGNOSIS — E05.00 GRAVES DISEASE: ICD-10-CM

## 2025-02-24 PROCEDURE — 3430000000 HC RX DIAGNOSTIC RADIOPHARMACEUTICAL: Performed by: RADIOLOGY

## 2025-02-24 PROCEDURE — A9517 I131 IODIDE CAP, RX: HCPCS | Performed by: RADIOLOGY

## 2025-02-24 PROCEDURE — 79005 NUCLEAR RX ORAL ADMIN: CPT

## 2025-02-24 RX ADMIN — SODIUM IODIDE I 131 10700 MICRO CURIE: 1 CAPSULE, GELATIN COATED ORAL at 09:22

## 2025-04-08 ENCOUNTER — OFFICE VISIT (OUTPATIENT)
Dept: ENDOCRINOLOGY | Age: 40
End: 2025-04-08
Payer: MEDICARE

## 2025-04-08 VITALS
WEIGHT: 179 LBS | OXYGEN SATURATION: 100 % | SYSTOLIC BLOOD PRESSURE: 122 MMHG | DIASTOLIC BLOOD PRESSURE: 79 MMHG | HEIGHT: 67 IN | BODY MASS INDEX: 28.09 KG/M2 | HEART RATE: 84 BPM

## 2025-04-08 DIAGNOSIS — E55.9 VITAMIN D DEFICIENCY: ICD-10-CM

## 2025-04-08 DIAGNOSIS — E05.00 GRAVES DISEASE: Primary | ICD-10-CM

## 2025-04-08 PROCEDURE — G2211 COMPLEX E/M VISIT ADD ON: HCPCS | Performed by: CLINICAL NURSE SPECIALIST

## 2025-04-08 PROCEDURE — 4004F PT TOBACCO SCREEN RCVD TLK: CPT | Performed by: CLINICAL NURSE SPECIALIST

## 2025-04-08 PROCEDURE — G8427 DOCREV CUR MEDS BY ELIG CLIN: HCPCS | Performed by: CLINICAL NURSE SPECIALIST

## 2025-04-08 PROCEDURE — 99214 OFFICE O/P EST MOD 30 MIN: CPT | Performed by: CLINICAL NURSE SPECIALIST

## 2025-04-08 PROCEDURE — G8419 CALC BMI OUT NRM PARAM NOF/U: HCPCS | Performed by: CLINICAL NURSE SPECIALIST

## 2025-04-08 NOTE — PROGRESS NOTES
[facility] Department of Endocrinology Diabetes and Metabolism   [address] [place] [address]   Phone: [phone]  Fax: [phone]    Date of Service: 4/8/2025  Primary Care Physician: Moises Faust MD  Provider: HILARIA High - CNS     Reason for the visit:  Graves Hyperthyroidism, vitD deficiency      History of Present Illness:  The history is provided by the patient. No  was used. Accuracy of the patient data is excellent.    Darren M Gilford is a 39 y.o.  y.o. male seen in the clinic today for follow up visit on graves hyperthyroidism      The patient was in his usual state health until July 2018 when started c/o intermittent palpitations, swelling in the area of the thyroid gland, unintentional weight loss, worsening nervousness, anxiety, irritability, tremor, sweating, heat intolerance, changes in bowel habits, muscle weakness and difficulty sleeping.  Labs 9/26/2018:  TSH < 0.005, free T 7.89    10/2/2018 Thyroid US   Thyroid gland diffusely heterogenous  Rt lobe measures 6.5 x 2.8 x 2.7 cm --> increase vascularity no nodules  Lt lobe measures 5.9 c 2.4 x 3.3 cm --> increase vascularity, no nodules      10/23/2018 Thyroid Uptake and scan   Homogenous uptake, 24 hrs uptake was 79.9% consistent with graves disease     Pt was on Methimazole form 9/2018 to 8/2019   Methimazole was stopped in 8/2019 because very low thyroid hormones      Methimazole was restarted as thyroid function showed thyroid toxicity    Was on Methimazole but stopped 2/25 as he received RODRIGUEZ [place] with 10.7 mCi    Due for labs now       PAST MEDICAL HISTORY   Past Medical History:   Diagnosis Date    Hyperthyroidism     Vitamin D deficiency        PAST SURGICAL HISTORY   No past surgical history on file.    SOCIAL HISTORY   Tobacco:   reports that he has been smoking cigarettes. He has never used smokeless tobacco.  Alcohol:   reports current alcohol

## 2025-04-23 ENCOUNTER — HOSPITAL ENCOUNTER (OUTPATIENT)
Age: 40
Discharge: HOME | End: 2025-04-23
Payer: MEDICARE

## 2025-04-23 DIAGNOSIS — E05.00 GRAVES DISEASE: ICD-10-CM

## 2025-04-23 LAB
T4 FREE SERPL-MCNC: 3 NG/DL (ref 0.9–1.7)
TSH SERPL DL<=0.05 MIU/L-ACNC: <0.01 UIU/ML (ref 0.27–4.2)

## 2025-04-23 PROCEDURE — 36415 COLL VENOUS BLD VENIPUNCTURE: CPT

## 2025-04-23 PROCEDURE — 84443 ASSAY THYROID STIM HORMONE: CPT

## 2025-04-23 PROCEDURE — 84439 ASSAY OF FREE THYROXINE: CPT

## 2025-04-30 ENCOUNTER — RESULTS FOLLOW-UP (OUTPATIENT)
Dept: ENDOCRINOLOGY | Age: 40
End: 2025-04-30

## 2025-04-30 DIAGNOSIS — E05.00 GRAVES DISEASE: Primary | ICD-10-CM

## 2025-06-27 ENCOUNTER — HOSPITAL ENCOUNTER (OUTPATIENT)
Age: 40
Discharge: HOME | End: 2025-06-27
Payer: MEDICARE

## 2025-06-27 DIAGNOSIS — E05.00 GRAVES DISEASE: ICD-10-CM

## 2025-06-27 LAB
T4 FREE SERPL-MCNC: 3.7 NG/DL (ref 0.9–1.7)
TSH SERPL DL<=0.05 MIU/L-ACNC: <0.01 UIU/ML (ref 0.27–4.2)

## 2025-06-27 PROCEDURE — 36415 COLL VENOUS BLD VENIPUNCTURE: CPT

## 2025-06-27 PROCEDURE — 84443 ASSAY THYROID STIM HORMONE: CPT

## 2025-06-27 PROCEDURE — 84439 ASSAY OF FREE THYROXINE: CPT

## 2025-06-30 ENCOUNTER — RESULTS FOLLOW-UP (OUTPATIENT)
Dept: ENDOCRINOLOGY | Age: 40
End: 2025-06-30

## 2025-06-30 DIAGNOSIS — E05.00 GRAVES DISEASE: Primary | ICD-10-CM

## 2025-06-30 RX ORDER — METHIMAZOLE 5 MG/1
5 TABLET ORAL DAILY
Qty: 30 TABLET | Refills: 3 | Status: SHIPPED | OUTPATIENT
Start: 2025-06-30 | End: 2025-10-28

## 2025-07-01 NOTE — TELEPHONE ENCOUNTER
----- Message from Carmine VEGAS sent at 6/30/2025 11:15 AM EDT -----  Please call patient and inform him TFTs are still showing hyperthyroidism.  Please have patient restart methimazole 5 mg 1 tablet daily and repeat TFTs in 4 weeks.  Labs ordered.  Patient may need to repeat radioactive iodine treatment in the future as sometimes the first dose does not resolve hyperthyroid.  Rx sent.  Labs ordered

## 2025-09-03 DIAGNOSIS — E05.00 GRAVES DISEASE: Primary | ICD-10-CM

## 2025-09-03 RX ORDER — METHIMAZOLE 5 MG/1
5 TABLET ORAL DAILY
Qty: 30 TABLET | Refills: 3 | Status: SHIPPED | OUTPATIENT
Start: 2025-09-03 | End: 2026-01-01